# Patient Record
Sex: MALE | Race: WHITE | NOT HISPANIC OR LATINO | Employment: FULL TIME | URBAN - METROPOLITAN AREA
[De-identification: names, ages, dates, MRNs, and addresses within clinical notes are randomized per-mention and may not be internally consistent; named-entity substitution may affect disease eponyms.]

---

## 2025-01-23 ENCOUNTER — LAB (OUTPATIENT)
Dept: LAB | Facility: LAB | Age: 50
End: 2025-01-23
Payer: COMMERCIAL

## 2025-01-23 ENCOUNTER — HOSPITAL ENCOUNTER (OUTPATIENT)
Dept: RADIOLOGY | Facility: HOSPITAL | Age: 50
Discharge: HOME | End: 2025-01-23
Payer: COMMERCIAL

## 2025-01-23 ENCOUNTER — PRE-ADMISSION TESTING (OUTPATIENT)
Dept: PREADMISSION TESTING | Facility: HOSPITAL | Age: 50
End: 2025-01-23
Payer: COMMERCIAL

## 2025-01-23 VITALS
TEMPERATURE: 98.6 F | SYSTOLIC BLOOD PRESSURE: 145 MMHG | DIASTOLIC BLOOD PRESSURE: 96 MMHG | RESPIRATION RATE: 14 BRPM | OXYGEN SATURATION: 97 % | HEIGHT: 72 IN | BODY MASS INDEX: 33.77 KG/M2 | HEART RATE: 96 BPM | WEIGHT: 249.34 LBS

## 2025-01-23 DIAGNOSIS — N28.89 RIGHT RENAL MASS: ICD-10-CM

## 2025-01-23 DIAGNOSIS — R10.9 FLANK PAIN: ICD-10-CM

## 2025-01-23 DIAGNOSIS — Z01.818 PREOP TESTING: Primary | ICD-10-CM

## 2025-01-23 DIAGNOSIS — Z01.818 PREOP TESTING: ICD-10-CM

## 2025-01-23 LAB
ABO GROUP (TYPE) IN BLOOD: NORMAL
ALBUMIN SERPL BCP-MCNC: 5 G/DL (ref 3.4–5)
ALP SERPL-CCNC: 65 U/L (ref 33–120)
ALT SERPL W P-5'-P-CCNC: 42 U/L (ref 10–52)
ANION GAP SERPL CALC-SCNC: 14 MMOL/L (ref 10–20)
ANTIBODY SCREEN: NORMAL
APPEARANCE UR: CLEAR
APTT PPP: 36 SECONDS (ref 27–38)
AST SERPL W P-5'-P-CCNC: 31 U/L (ref 9–39)
ATRIAL RATE: 96 BPM
BILIRUB SERPL-MCNC: 1.2 MG/DL (ref 0–1.2)
BILIRUB UR STRIP.AUTO-MCNC: NEGATIVE MG/DL
BUN SERPL-MCNC: 9 MG/DL (ref 6–23)
CALCIUM SERPL-MCNC: 9.9 MG/DL (ref 8.6–10.3)
CHLORIDE SERPL-SCNC: 100 MMOL/L (ref 98–107)
CO2 SERPL-SCNC: 29 MMOL/L (ref 21–32)
COLOR UR: NORMAL
CREAT SERPL-MCNC: 0.85 MG/DL (ref 0.5–1.3)
EGFRCR SERPLBLD CKD-EPI 2021: >90 ML/MIN/1.73M*2
ERYTHROCYTE [DISTWIDTH] IN BLOOD BY AUTOMATED COUNT: 13.2 % (ref 11.5–14.5)
GLUCOSE SERPL-MCNC: 112 MG/DL (ref 74–99)
GLUCOSE UR STRIP.AUTO-MCNC: NORMAL MG/DL
HCT VFR BLD AUTO: 49 % (ref 41–52)
HGB BLD-MCNC: 16.8 G/DL (ref 13.5–17.5)
INR PPP: 1.1 (ref 0.9–1.1)
KETONES UR STRIP.AUTO-MCNC: NEGATIVE MG/DL
LEUKOCYTE ESTERASE UR QL STRIP.AUTO: NEGATIVE
MCH RBC QN AUTO: 28.9 PG (ref 26–34)
MCHC RBC AUTO-ENTMCNC: 34.3 G/DL (ref 32–36)
MCV RBC AUTO: 84 FL (ref 80–100)
NITRITE UR QL STRIP.AUTO: NEGATIVE
NRBC BLD-RTO: 0 /100 WBCS (ref 0–0)
P AXIS: 51 DEGREES
P OFFSET: 200 MS
P ONSET: 143 MS
PH UR STRIP.AUTO: 5.5 [PH]
PLATELET # BLD AUTO: 210 X10*3/UL (ref 150–450)
POTASSIUM SERPL-SCNC: 4.1 MMOL/L (ref 3.5–5.3)
PR INTERVAL: 156 MS
PROT SERPL-MCNC: 8.5 G/DL (ref 6.4–8.2)
PROT UR STRIP.AUTO-MCNC: NEGATIVE MG/DL
PROTHROMBIN TIME: 12 SECONDS (ref 9.8–12.8)
Q ONSET: 221 MS
QRS COUNT: 16 BEATS
QRS DURATION: 82 MS
QT INTERVAL: 356 MS
QTC CALCULATION(BAZETT): 449 MS
QTC FREDERICIA: 416 MS
R AXIS: 33 DEGREES
RBC # BLD AUTO: 5.81 X10*6/UL (ref 4.5–5.9)
RBC # UR STRIP.AUTO: NEGATIVE /UL
RH FACTOR (ANTIGEN D): NORMAL
SODIUM SERPL-SCNC: 139 MMOL/L (ref 136–145)
SP GR UR STRIP.AUTO: 1.01
T AXIS: 38 DEGREES
T OFFSET: 399 MS
UROBILINOGEN UR STRIP.AUTO-MCNC: NORMAL MG/DL
VENTRICULAR RATE: 96 BPM
WBC # BLD AUTO: 7.8 X10*3/UL (ref 4.4–11.3)

## 2025-01-23 PROCEDURE — 71046 X-RAY EXAM CHEST 2 VIEWS: CPT

## 2025-01-23 PROCEDURE — 85027 COMPLETE CBC AUTOMATED: CPT

## 2025-01-23 PROCEDURE — 86901 BLOOD TYPING SEROLOGIC RH(D): CPT

## 2025-01-23 PROCEDURE — 86850 RBC ANTIBODY SCREEN: CPT

## 2025-01-23 PROCEDURE — 99202 OFFICE O/P NEW SF 15 MIN: CPT | Performed by: NURSE PRACTITIONER

## 2025-01-23 PROCEDURE — 80053 COMPREHEN METABOLIC PANEL: CPT

## 2025-01-23 PROCEDURE — 93005 ELECTROCARDIOGRAM TRACING: CPT

## 2025-01-23 PROCEDURE — 81003 URINALYSIS AUTO W/O SCOPE: CPT

## 2025-01-23 PROCEDURE — 85730 THROMBOPLASTIN TIME PARTIAL: CPT

## 2025-01-23 PROCEDURE — 85610 PROTHROMBIN TIME: CPT

## 2025-01-23 PROCEDURE — 83036 HEMOGLOBIN GLYCOSYLATED A1C: CPT

## 2025-01-23 PROCEDURE — 86900 BLOOD TYPING SEROLOGIC ABO: CPT

## 2025-01-23 RX ORDER — SILDENAFIL 50 MG/1
50 TABLET, FILM COATED ORAL DAILY PRN
COMMUNITY

## 2025-01-23 RX ORDER — INSULIN GLARGINE 100 [IU]/ML
47 INJECTION, SOLUTION SUBCUTANEOUS NIGHTLY
COMMUNITY

## 2025-01-23 RX ORDER — CHOLECALCIFEROL (VITAMIN D3) 50 MCG
50 TABLET ORAL DAILY
COMMUNITY

## 2025-01-23 RX ORDER — DUPILUMAB 300 MG/2ML
300 INJECTION, SOLUTION SUBCUTANEOUS
COMMUNITY

## 2025-01-23 ASSESSMENT — DUKE ACTIVITY SCORE INDEX (DASI)
CAN YOU DO MODERATE WORK AROUND THE HOUSE LIKE VACUUMING, SWEEPING FLOORS OR CARRYING GROCERIES: YES
CAN YOU CLIMB A FLIGHT OF STAIRS OR WALK UP A HILL: YES
CAN YOU PARTICIPATE IN MODERATE RECREATIONAL ACTIVITIES LIKE GOLF, BOWLING, DANCING, DOUBLES TENNIS OR THROWING A BASEBALL OR FOOTBALL: YES
TOTAL_SCORE: 58.2
CAN YOU DO HEAVY WORK AROUND THE HOUSE LIKE SCRUBBING FLOORS OR LIFTING AND MOVING HEAVY FURNITURE: YES
CAN YOU PARTICIPATE IN STRENOUS SPORTS LIKE SWIMMING, SINGLES TENNIS, FOOTBALL, BASKETBALL, OR SKIING: YES
CAN YOU WALK A BLOCK OR TWO ON LEVEL GROUND: YES
CAN YOU HAVE SEXUAL RELATIONS: YES
CAN YOU DO YARD WORK LIKE RAKING LEAVES, WEEDING OR PUSHING A MOWER: YES
CAN YOU DO LIGHT WORK AROUND THE HOUSE LIKE DUSTING OR WASHING DISHES: YES
CAN YOU WALK INDOORS, SUCH AS AROUND YOUR HOUSE: YES
CAN YOU RUN A SHORT DISTANCE: YES
DASI METS SCORE: 9.9
CAN YOU TAKE CARE OF YOURSELF (EAT, DRESS, BATHE, OR USE TOILET): YES

## 2025-01-23 ASSESSMENT — PAIN - FUNCTIONAL ASSESSMENT: PAIN_FUNCTIONAL_ASSESSMENT: 0-10

## 2025-01-23 ASSESSMENT — PAIN SCALES - GENERAL: PAINLEVEL_OUTOF10: 0 - NO PAIN

## 2025-01-23 ASSESSMENT — ACTIVITIES OF DAILY LIVING (ADL): ADL_SCORE: 0

## 2025-01-23 ASSESSMENT — LIFESTYLE VARIABLES: SMOKING_STATUS: NONSMOKER

## 2025-01-23 NOTE — PREPROCEDURE INSTRUCTIONS
Medication List            Accurate as of January 23, 2025  1:59 PM. Always use your most recent med list.                cholecalciferol 50 MCG (2000 UT) tablet  Commonly known as: Vitamin D-3  Additional Medication Adjustments for Surgery: Take last dose 7 days before surgery     Dupixent Syringe 300 mg/2 mL prefilled syringe  Generic drug: dupilumab  Medication Adjustments for Surgery: Do Not take on the morning of surgery     Lantus U-100 Insulin 100 unit/mL injection  Generic drug: insulin glargine  Additional Medication Adjustments for Surgery: Other (Comment)  Notes to patient: Take 1/2 dose the evening before OR and take 1/2 dose the morning of OR     sildenafil 50 mg tablet  Commonly known as: Viagra  Medication Adjustments for Surgery: Take last dose 3 days before surgery          Do not use Imodium day of surgery.                PRE-OPERATIVE INSTRUCTIONS    You will receive notification one business day prior to your procedure to confirm your arrival time. It is important that you answer your phone and/or check your messages during this time. If you do not hear from the surgery center by 5 pm. the day before your procedure, please call [phone].     Please enter the building through the Outpatient entrance and take the elevator off the lobby to the 2nd floor then check in at the Outpatient Surgery desk on the 2nd floor.    INSTRUCTIONS:  Talk to your surgeon for instructions if you should stop your aspirin, blood thinner, or diabetes medicines.  DO NOT take any multivitamins or over the counter supplements for 7-10 days before surgery.  If not being admitted, you must have an adult immediately available to drive you home after surgery. We also highly recommend you have someone stay with you for the entire day and night of your surgery.  For children having surgery, a parent or legal guardian must accompany them to the surgery center. If this is not possible, please call [phone] to make  additional arrangements.  For adults who are unable to consent or make medical decisions for themselves, a legal guardian or Power of  must accompany them to the surgery center. If this is not possible, please call [phone] to make additional arrangements.  Wear comfortable, loose fitting clothing.  All jewelry and piercings must be removed. If you are unable to remove an item or have a dermal piercing, please be sure to tell the nurse when you arrive for surgery.  Nail polish and make-up must be removed.  Avoid smoking or consuming alcohol for 24 hours before surgery.  To help prevent infection, please take a shower/bath and wash your hair the night before and/or morning of surgery (or follow other specific bathing instructions provided).    Preoperative Fasting Guidelines    Why must I stop eating and drinking near surgery time?  With sedation, food or liquid in your stomach can enter your lungs causing serious complications  Increases nausea and vomiting    When do I need to stop eating and drinking before my surgery?  Do not eat any solid food after midnight the night before your surgery/procedure unless otherwise instructed by your surgeon.   You may have up to 13.5 ounces of clear liquid until TWO hours before your instructed arrival time to the hospital.  This includes water, black tea/coffee, (no milk or cream) apple juice, and electrolyte drinks (Gatorade).   You may chew gum until TWO hours before your surgery/procedure      If applicable, notify your surgeons office immediately of any new skin changes that occur to the surgical limb.      If you have any questions or concerns, please call Pre-Admission Testing at [phone].       Home Preoperative Antibacterial Shower with Chlorhexidine gluconate (CHG)     What is a home preoperative antibacterial shower?  This shower is a way of cleaning the skin with a germ killing solution before surgery. The solution contains chlorhexidine gluconate,  commonly known as CHG. CHG is a skin cleanser with germ killing ability. Let your doctor know if you are allergic to chlorhexidine.    Why do I need to take a preoperative antibacterial shower?  Skin is not sterile. It is best to try to make your skin as free of germs as possible before surgery. Proper cleansing with a germ killing soap before surgery can lower the number of germs on your skin. This helps to reduce the risk of infection at the surgical site. Following the instructions listed below will help you prepare your skin for surgery.    How do I use the solution?  Begin using your CHG soap the night before and again the morning of your procedure.   Do not shave the day before or day of surgery.  Remove all jewelry until after surgery. Take off rings and take out all body-piercing jewelry.  Wash your face and hair with normal soap and shampoo before you use the CHG soap.  Apply the CHG solution to a clean wet washcloth. Move away from the water to avoid premature rinsing of the CHG soap as you are applying. Firmly lather your entire body from the neck down. Do not use CHG on your face, eyes, ears, or genitals.   Pay special attention to the area where your incisions will be located.  Do not scrub your skin too hard.  It is important to allow the CHG soap to sit on your skin for 3-5 minutes.  Rinse the solution off your body completely. Do not wash with your normal soap after the CHG soap solution.  Pat yourself dry with a clean, soft towel.  Do not apply powders, lotions or deodorants as these might block how the CHG soap works.   Dress in clean clothing.  Be sure to sleep with clean, freshly laundered sheets.  Be aware that CHG can cause stains on fabric. Rinse your washcloth and other linens that have contact with CHG completely. Use only non-chlorine detergents to launder the items used.

## 2025-01-23 NOTE — CPM/PAT H&P
CPM/PAT Evaluation       Name: Dean Alfaro (Dean Alfaro)  /Age:  y.o.     In-Person      Chief Complaint: Right kidney cancer    HPI49 year old male for right robotic partial nephrectomy with intra op ultrasound - right 25. Patient states he was found to have a right renal mass and was diagnosed with right renal cancer in December that was found after he was diagnosed with food poisoning. He denies dysuria, hematuria, difficulties urinating, abdominal pain or flank pain.     Past Medical History:   Diagnosis Date    Asthma     Eosinophilic esophagitis     GERD (gastroesophageal reflux disease)     History of kidney cancer     Marcin's syndrome     Personal history of other specified conditions 2019    History of shortness of breath    Rheumatoid arthritis     Type 2 diabetes mellitus     Ulcerative colitis        Past Surgical History:   Procedure Laterality Date    ILEOSTOMY      ILEOSTOMY CLOSURE      OTHER SURGICAL HISTORY  2019    Colon surgery    TONSILLECTOMY         Patient  reports being sexually active.    Family History   Problem Relation Name Age of Onset    Cancer Mother      Heart disease Father      Cancer Father         No Known Allergies    Prior to Admission medications    Not on File     Refer to updated medication list on file       Constitutional: Negative for fever, chills, or sweats   ENMT: Negative for nasal discharge, congestion, ear pain, mouth pain, throat pain   Respiratory: Negative for cough, wheezing, shortness of breath   Cardiac: Negative for chest pain, dyspnea on exertion, palpitations   Gastrointestinal: Negative for nausea, vomiting, diarrhea, constipation, abdominal pain  Genitourinary: Negative for dysuria, flank pain, frequency, hematuria   Musculoskeletal: Negative for decreased ROM, pain, swelling, weakness   Neurological: Negative for dizziness, confusion, headache  Psychiatric: Negative for mood changes   Skin: Negative for  itching, rash, ulcer    Hematologic/Lymph: Negative for bruising, easy bleeding  Allergic/Immunologic: Negative itching, sneezing, swelling     Physical Exam  Constitutional:       Appearance: Normal appearance.   HENT:      Head: Normocephalic.   Eyes:      Extraocular Movements: Extraocular movements intact.   Cardiovascular:      Rate and Rhythm: Normal rate and regular rhythm.      Heart sounds: Normal heart sounds.   Pulmonary:      Effort: Pulmonary effort is normal.      Breath sounds: Normal breath sounds.   Abdominal:      General: Bowel sounds are normal.      Palpations: Abdomen is soft.   Musculoskeletal:         General: Normal range of motion.      Cervical back: Normal range of motion.   Skin:     General: Skin is warm and dry.   Neurological:      Mental Status: He is alert and oriented to person, place, and time.   Psychiatric:         Mood and Affect: Mood normal.      PAT AIRWAY:   Airway:     Neck ROM::  Full   Denies missing or loose teeth     Testing/Diagnostic:   Lab Results   Component Value Date    HGBA1C 8.5 (H) 11/18/2024      Patient Specialist/PCP: Dr. Hamm    Visit Vitals  BP (!) 145/96   Pulse 96   Temp 37 °C (98.6 °F) (Temporal)   Resp 14   Ht 1.829 m (6')   Wt 113 kg (249 lb 5.4 oz)   SpO2 97%   BMI 33.82 kg/m²   Smoking Status Never   BSA 2.4 m²       DASI Risk Score      Flowsheet Row Pre-Admission Testing from 1/23/2025 in [facility] Questionnaire Series Submission from 1/21/2025 in [facility] with Generic Provider Tricia   Can you take care of yourself (eat, dress, bathe, or use toilet)?  2.75 filed at 01/23/2025 1332 2.75  filed at 01/21/2025 1013   Can you walk indoors, such as around your house? 1.75 filed at 01/23/2025 1332 1.75  filed at 01/21/2025 1013   Can you walk a block or two on level ground?  2.75 filed at 01/23/2025 1332 2.75  filed at 01/21/2025 1013   Can you climb a flight of stairs or walk up a hill? 5.5 filed at 01/23/2025 1332 5.5  filed at  01/21/2025 1013   Can you run a short distance? 8 filed at 01/23/2025 1332 8  filed at 01/21/2025 1013   Can you do light work around the house like dusting or washing dishes? 2.7 filed at 01/23/2025 1332 2.7  filed at 01/21/2025 1013   Can you do moderate work around the house like vacuuming, sweeping floors or carrying groceries? 3.5 filed at 01/23/2025 1332 3.5  filed at 01/21/2025 1013   Can you do heavy work around the house like scrubbing floors or lifting and moving heavy furniture?  8 filed at 01/23/2025 1332 8  filed at 01/21/2025 1013   Can you do yard work like raking leaves, weeding or pushing a mower? 4.5 filed at 01/23/2025 1332 4.5  filed at 01/21/2025 1013   Can you have sexual relations? 5.25 filed at 01/23/2025 1332 5.25  filed at 01/21/2025 1013   Can you participate in moderate recreational activities like golf, bowling, dancing, doubles tennis or throwing a baseball or football? 6 filed at 01/23/2025 1332 6  filed at 01/21/2025 1013   Can you participate in strenous sports like swimming, singles tennis, football, basketball, or skiing? 7.5 filed at 01/23/2025 1332 7.5  filed at 01/21/2025 1013   DASI SCORE 58.2 filed at 01/23/2025 1332 58.2  filed at 01/21/2025 1013   METS Score (Will be calculated only when all the questions are answered) 9.9 filed at 01/23/2025 1332 9.9  filed at 01/21/2025 1013          Linneai DVT Assessment      Flowsheet Row Pre-Admission Testing from 1/23/2025 in [facility]   DVT Score (IF A SCORE IS NOT CALCULATING, MUST SELECT A BMI TO COMPLETE) 9 filed at 01/23/2025 1331   Surgical Factors Major surgery planned, lasting over 3 hours filed at 01/23/2025 1331   BMI (BMI MUST BE CHOSEN) 31-40 (Obesity) filed at 01/23/2025 1331          Modified Frailty Index      Flowsheet Row Pre-Admission Testing from 1/23/2025 in [facility]   Non-independent functional status (problems with dressing, bathing, personal grooming, or cooking) 0 filed at  01/23/2025 1332   History of diabetes mellitus  0.0909 filed at 01/23/2025 1332   History of COPD 0 filed at 01/23/2025 1332   History of CHF No filed at 01/23/2025 1332   History of MI 0 filed at 01/23/2025 1332   History of Percutaneous Coronary Intervention, Cardiac Surgery, or Angina No filed at 01/23/2025 1332   Hypertension requiring the use of medication  0 filed at 01/23/2025 1332   Peripheral vascular disease 0 filed at 01/23/2025 1332   Impaired sensorium (cognitive impairement or loss, clouding, or delirium) 0 filed at 01/23/2025 1332   TIA or CVA withouy residual deficit 0 filed at 01/23/2025 1332   Cerebrovascular accident with deficit 0 filed at 01/23/2025 1332   Modified Frailty Index Calculator .0909 filed at 01/23/2025 1332          CHADS2 Stroke Risk  Current as of today        N/A 3 to 100%: High Risk   2 to < 3%: Medium Risk   0 to < 2%: Low Risk     Last Change: N/A          This score determines the patient's risk of having a stroke if the patient has atrial fibrillation.        This score is not applicable to this patient. Components are not calculated.          Revised Cardiac Risk Index      Flowsheet Row Pre-Admission Testing from 1/23/2025 in [facility]   High-Risk Surgery (Intraperitoneal, Intrathoracic,Suprainguinal vascular) 0 filed at 01/23/2025 1332   History of ischemic heart disease (History of MI, History of positive exercuse test, Current chest paint considered due to myocardial ischemia, Use of nitrate therapy, ECG with pathological Q Waves) 0 filed at 01/23/2025 1332   History of congestive heart failure (pulmonary edemia, bilateral rales or S3 gallop, Paroxysmal nocturnal dyspnea, CXR showing pulmonary vascular redistribution) 0 filed at 01/23/2025 1332   History of cerebrovascular disease (Prior TIA or stroke) 0 filed at 01/23/2025 1332   Pre-operative insulin treatment 1 filed at 01/23/2025 1332   Pre-operative creatinine>2 mg/dl 0 filed at 01/23/2025 1332    Revised Cardiac Risk Calculator 1 filed at 2025 1332          Apfel Simplified Score      Flowsheet Row Pre-Admission Testing from 2025 in [facility]   Smoking status 1 filed at 2025 1332   History of motion sickness or PONV  0 filed at 2025 1332   Use of postoperative opioids 1 filed at 2025 1332   Gender - Female 0=No filed at 2025 1332   Apfel Simplified Score Calculator 2 filed at 2025 1332          Risk Analysis Index Results This Encounter         2025  1333             Do you live in a place other than your own home?: 0    When did you begin living in the place you are currently residing?: Greater than one year ago    Any kidney failure, kidney not working well, or seeing a kidney doctor (nephrologist)? If yes, was this for kidney stones or another problem?: 0 No    Any history of chronic (long-term) congestive heart failure (CHF)?: 0 No    Any shortness of breath when resting?: 0 No    In the past five years, have you been diagnosed with or treated for cancer?: Yes    During the last 3 months has it become difficult for you to remember things or organize your thoughts?: 0 No    Have you lost weight of 10 pounds or more in the past 3 months without trying?: 0 No    Do you have any loss of appetitie?: 0 No    Getting Around (Mobility): 0 Can get around without help    Eatin Can plan and prepare own meals    Toiletin Can use toilet without any help    Personal Hygiene (Bathing, Hand Washing, Changing Clothes): 0 Can shower or bathe without any help    FLOR Cancer History: Patient indicates history of cancer    Total Risk Analysis Index Score Without Cancer: 17    Total Risk Analysis Index Score: 35          Stop Bang Score      Flowsheet Row Pre-Admission Testing from 2025 in [facility] Questionnaire Series Submission from 2025 in [facility] with Generic Provider Tricia   Do you snore loudly? 0 filed at  01/23/2025 1331 0  filed at 01/21/2025 1013   Do you often feel tired or fatigued after your sleep? 0 filed at 01/23/2025 1331 0  filed at 01/21/2025 1013   Has anyone ever observed you stop breathing in your sleep? 0 filed at 01/23/2025 1331 0  filed at 01/21/2025 1013   Do you have or are you being treated for high blood pressure? 0 filed at 01/23/2025 1331 0  filed at 01/21/2025 1013   Recent BMI (Calculated) 33.8 filed at 01/23/2025 1331 36  filed at 01/21/2025 1013   Is BMI greater than 35 kg/m2? 0=No filed at 01/23/2025 1331 1=Yes  filed at 01/21/2025 1013   Age older than 50 years old? 1=Yes filed at 01/23/2025 1331 1=Yes  filed at 01/21/2025 1013   Is your neck circumference greater than 17 inches (Male) or 16 inches (Female)? 1 filed at 01/23/2025 1331 --   Gender - Male 1=Yes filed at 01/23/2025 1331 1=Yes  filed at 01/21/2025 1013   STOP-BANG Total Score 3 filed at 01/23/2025 1331 --          Prodigy: High Risk  Total Score: 8              Prodigy Gender Score          ARISCAT Score for Postoperative Pulmonary Complications      Flowsheet Row Pre-Admission Testing from 1/23/2025 in [facility]   Age Calculated Score 3 filed at 01/23/2025 1333   Preoperative SpO2 0 filed at 01/23/2025 1333   Respiratory infection in the last month Either upper or lower (i.e., URI, bronchitis, pneumonia), with fever and antibiotic treatment 0 filed at 01/23/2025 1333   Preoperative anemia (Hgb less than 10 g/dl) 0 filed at 01/23/2025 1333   Surgical incision  15 filed at 01/23/2025 1333   Duration of surgery  23 filed at 01/23/2025 1333   Emergency Procedure  0 filed at 01/23/2025 1333   ARISCAT Total Score  41 filed at 01/23/2025 1333          Melissa Perioperative Risk for Myocardial Infarction or Cardiac Arrest (KENYON)      Flowsheet Row Pre-Admission Testing from 1/23/2025 in [facility]   Calculated Age Score 1.02 filed at 01/23/2025 6303   Functional Status  0 filed at 01/23/2025 1333   ASA  Class  -3.29 filed at 01/23/2025 1333   Creatinine 0 filed at 01/23/2025 1333   Type of Procedure  0.59 filed at 01/23/2025 1333   KENYON Total Score  -6.93 filed at 01/23/2025 1333   KENYON % 0.1 filed at 01/23/2025 1333          Assessment and Plan:     Preop:   OR with Dr Arana.   Labs today as ordered per Dr. Arana. Repeat hgbAIC today  EKG obtained and enclosed. NSR at 96 bpm.     Marcin's Syndrome (Sarcoidosis)    Neurologic:   Brain Exercise written information provided to patient  The patient is at an increased risk for perioperative stroke secondary to DM .    Cardiac:  DASI Score: 58.2   MET Score: 9.9  RCRI  1 which is 6% 30 day risk of MACE (risk for cardiac death, nonfatal myocardial infarction, and nonfactal cardiac arrest)  KENYON score which indicates a 0.1% risk of intraoperative or 30-day postoperative MACE    Pulmonary:   STOP-BANG score of  3. Intermediate risk of obstructive sleep apnea.   ARISCAT: 41 points which is a intermediate (13.3%) risk of in-hospital post-op pulmonary complications  Asthma: rare use of inhaler (last time 2 years ago)    Endocrine:  Type 2 Diabetes: on Insulin, last hgbAIC 8.5. Blood sugars at home 130-140s    GI:  Apfel: 2 points 39% risk for post operative N/V  Eosinophilic Esophagitis: compliant with medication  GERD  Ulcerative Colitis: patient has a j pouch    /Renal:   Above    Musculoskeletal:  RA    Hematologic:   Caprini score 9, patient at highest risk for perioperative DVT. Patient provided with VTE education/handout.     Anesthesia: No history of anesthesia complications. No anesthesia concerns.      *See risk scores as previously documented

## 2025-01-23 NOTE — PREPROCEDURE INSTRUCTIONS
Thank you for visiting Preadmission Testing at [facility]. If you have any changes to your health condition, please call the SURGEON's office to alert them and give them details of your symptoms.        Preoperative Brain Exercises    What are brain exercises?  A brain exercise is any activity that engages your thinking (cognitive) skills.    What types of activities are considered brain exercises?  Jigsaw puzzles, crossword puzzles, word jumble, memory games, word search, and many more.  Many can be found free online or on your phone via a mobile bhaskar.    Why should I do brain exercises before my surgery?  More recent research has shown brain exercise before surgery can lower the risk of postoperative delirium (confusion) which can be especially important for older adults.  Patients who did brain exercises for 5 to 10 hours the days before surgery, cut their risk of postoperative delirium in half up to 1 week after surgery.      Preoperative Deep Breathing Exercises    Why it is important to do deep breathing exercises before my surgery?  Deep breathing exercises strengthen your breathing muscles.  This helps you to recover after your surgery and decreases the chance of breathing complications.    How are the deep breathing exercises done?  Sit straight with your back supported.  Breathe in deeply and slowly through your nose. Your lower rib cage should expand and your abdomen may move forward.  Hold that breath for 3 to 5 seconds.  Breathe out through pursed lips, slowly and completely.  Rest and repeat 10 times every hour while awake.  Rest longer if you become dizzy or lightheaded.      Patient and Family Education   Ways You Can Help Prevent Blood Clots     This handout explains some simple things you can do to help prevent blood clots.      Blood clots are blockages that can form in the body's veins. When a blood clot forms in your deep veins, it may be called a deep vein thrombosis, or DVT for short. Blood clots can  happen in any part of the body where blood flows, but they are most common in the arms and legs. If a piece of a blood clot breaks free and travels to the lungs, it is called a pulmonary embolus (PE). A PE can be a very serious problem.      Being in the hospital or having surgery can raise your chances of getting a blood clot because you may not be well enough to move around as much as you normally do.      Ways you can help prevent blood clots in the hospital         Wearing SCDs. SCDs stands for Sequential Compression Devices.   SCDs are special sleeves that wrap around your legs  They attach to a pump that fills them with air to gently squeeze your legs every few minutes.   This helps return the blood in your legs to your heart.   SCDs should only be taken off when walking or bathing.   SCDs may not be comfortable, but they can help save your life.               Wearing compression stockings - if your doctor orders them. These special snug fitting stockings gently squeeze your legs to help blood flow.       Walking. Walking helps move the blood in your legs.   If your doctor says it is ok, try walking the halls at least   5 times a day. Ask us to help you get up, so you don't fall.      Taking any blood thinning medicines your doctor orders.          ©[facility]; 3/23        Ways you can help prevent blood clots at home       Wearing compression stockings - if your doctor orders them. ? Walking - to help move the blood in your legs.       Taking any blood thinning medicines your doctor orders.      Signs of a blood clot or PE      Tell your doctor or nurse know right away if you have of the problems listed below.    If you are at home, seek medical care right away. Call 911 for chest pain or problems breathing.          Signs of a blood clot (DVT) - such as pain,  swelling, redness or warmth in your arm or leg      Signs of a pulmonary embolism (PE) - such as chest     pain or feeling short of breath

## 2025-01-23 NOTE — H&P (VIEW-ONLY)
CPM/PAT Evaluation       Name: Dean Alfaro (Dean Alfaro)  /Age:  y.o.     In-Person      Chief Complaint: Right kidney cancer    HPI49 year old male for right robotic partial nephrectomy with intra op ultrasound - right 25. Patient states he was found to have a right renal mass and was diagnosed with right renal cancer in December that was found after he was diagnosed with food poisoning. He denies dysuria, hematuria, difficulties urinating, abdominal pain or flank pain.     Past Medical History:   Diagnosis Date    Asthma     Eosinophilic esophagitis     GERD (gastroesophageal reflux disease)     History of kidney cancer     Marcin's syndrome     Personal history of other specified conditions 2019    History of shortness of breath    Rheumatoid arthritis     Type 2 diabetes mellitus     Ulcerative colitis        Past Surgical History:   Procedure Laterality Date    ILEOSTOMY      ILEOSTOMY CLOSURE      OTHER SURGICAL HISTORY  2019    Colon surgery    TONSILLECTOMY         Patient  reports being sexually active.    Family History   Problem Relation Name Age of Onset    Cancer Mother      Heart disease Father      Cancer Father         No Known Allergies    Prior to Admission medications    Not on File     Refer to updated medication list on file       Constitutional: Negative for fever, chills, or sweats   ENMT: Negative for nasal discharge, congestion, ear pain, mouth pain, throat pain   Respiratory: Negative for cough, wheezing, shortness of breath   Cardiac: Negative for chest pain, dyspnea on exertion, palpitations   Gastrointestinal: Negative for nausea, vomiting, diarrhea, constipation, abdominal pain  Genitourinary: Negative for dysuria, flank pain, frequency, hematuria   Musculoskeletal: Negative for decreased ROM, pain, swelling, weakness   Neurological: Negative for dizziness, confusion, headache  Psychiatric: Negative for mood changes   Skin: Negative for  itching, rash, ulcer    Hematologic/Lymph: Negative for bruising, easy bleeding  Allergic/Immunologic: Negative itching, sneezing, swelling     Physical Exam  Constitutional:       Appearance: Normal appearance.   HENT:      Head: Normocephalic.   Eyes:      Extraocular Movements: Extraocular movements intact.   Cardiovascular:      Rate and Rhythm: Normal rate and regular rhythm.      Heart sounds: Normal heart sounds.   Pulmonary:      Effort: Pulmonary effort is normal.      Breath sounds: Normal breath sounds.   Abdominal:      General: Bowel sounds are normal.      Palpations: Abdomen is soft.   Musculoskeletal:         General: Normal range of motion.      Cervical back: Normal range of motion.   Skin:     General: Skin is warm and dry.   Neurological:      Mental Status: He is alert and oriented to person, place, and time.   Psychiatric:         Mood and Affect: Mood normal.      PAT AIRWAY:   Airway:     Neck ROM::  Full   Denies missing or loose teeth     Testing/Diagnostic:   Lab Results   Component Value Date    HGBA1C 8.5 (H) 11/18/2024      Patient Specialist/PCP: Dr. Hamm    Visit Vitals  BP (!) 145/96   Pulse 96   Temp 37 °C (98.6 °F) (Temporal)   Resp 14   Ht 1.829 m (6')   Wt 113 kg (249 lb 5.4 oz)   SpO2 97%   BMI 33.82 kg/m²   Smoking Status Never   BSA 2.4 m²       DASI Risk Score      Flowsheet Row Pre-Admission Testing from 1/23/2025 in [facility] Questionnaire Series Submission from 1/21/2025 in [facility] with Generic Provider Tricia   Can you take care of yourself (eat, dress, bathe, or use toilet)?  2.75 filed at 01/23/2025 1332 2.75  filed at 01/21/2025 1013   Can you walk indoors, such as around your house? 1.75 filed at 01/23/2025 1332 1.75  filed at 01/21/2025 1013   Can you walk a block or two on level ground?  2.75 filed at 01/23/2025 1332 2.75  filed at 01/21/2025 1013   Can you climb a flight of stairs or walk up a hill? 5.5 filed at 01/23/2025 1332 5.5  filed at  01/21/2025 1013   Can you run a short distance? 8 filed at 01/23/2025 1332 8  filed at 01/21/2025 1013   Can you do light work around the house like dusting or washing dishes? 2.7 filed at 01/23/2025 1332 2.7  filed at 01/21/2025 1013   Can you do moderate work around the house like vacuuming, sweeping floors or carrying groceries? 3.5 filed at 01/23/2025 1332 3.5  filed at 01/21/2025 1013   Can you do heavy work around the house like scrubbing floors or lifting and moving heavy furniture?  8 filed at 01/23/2025 1332 8  filed at 01/21/2025 1013   Can you do yard work like raking leaves, weeding or pushing a mower? 4.5 filed at 01/23/2025 1332 4.5  filed at 01/21/2025 1013   Can you have sexual relations? 5.25 filed at 01/23/2025 1332 5.25  filed at 01/21/2025 1013   Can you participate in moderate recreational activities like golf, bowling, dancing, doubles tennis or throwing a baseball or football? 6 filed at 01/23/2025 1332 6  filed at 01/21/2025 1013   Can you participate in strenous sports like swimming, singles tennis, football, basketball, or skiing? 7.5 filed at 01/23/2025 1332 7.5  filed at 01/21/2025 1013   DASI SCORE 58.2 filed at 01/23/2025 1332 58.2  filed at 01/21/2025 1013   METS Score (Will be calculated only when all the questions are answered) 9.9 filed at 01/23/2025 1332 9.9  filed at 01/21/2025 1013          Linneai DVT Assessment      Flowsheet Row Pre-Admission Testing from 1/23/2025 in [facility]   DVT Score (IF A SCORE IS NOT CALCULATING, MUST SELECT A BMI TO COMPLETE) 9 filed at 01/23/2025 1331   Surgical Factors Major surgery planned, lasting over 3 hours filed at 01/23/2025 1331   BMI (BMI MUST BE CHOSEN) 31-40 (Obesity) filed at 01/23/2025 1331          Modified Frailty Index      Flowsheet Row Pre-Admission Testing from 1/23/2025 in [facility]   Non-independent functional status (problems with dressing, bathing, personal grooming, or cooking) 0 filed at  01/23/2025 1332   History of diabetes mellitus  0.0909 filed at 01/23/2025 1332   History of COPD 0 filed at 01/23/2025 1332   History of CHF No filed at 01/23/2025 1332   History of MI 0 filed at 01/23/2025 1332   History of Percutaneous Coronary Intervention, Cardiac Surgery, or Angina No filed at 01/23/2025 1332   Hypertension requiring the use of medication  0 filed at 01/23/2025 1332   Peripheral vascular disease 0 filed at 01/23/2025 1332   Impaired sensorium (cognitive impairement or loss, clouding, or delirium) 0 filed at 01/23/2025 1332   TIA or CVA withouy residual deficit 0 filed at 01/23/2025 1332   Cerebrovascular accident with deficit 0 filed at 01/23/2025 1332   Modified Frailty Index Calculator .0909 filed at 01/23/2025 1332          CHADS2 Stroke Risk  Current as of today        N/A 3 to 100%: High Risk   2 to < 3%: Medium Risk   0 to < 2%: Low Risk     Last Change: N/A          This score determines the patient's risk of having a stroke if the patient has atrial fibrillation.        This score is not applicable to this patient. Components are not calculated.          Revised Cardiac Risk Index      Flowsheet Row Pre-Admission Testing from 1/23/2025 in [facility]   High-Risk Surgery (Intraperitoneal, Intrathoracic,Suprainguinal vascular) 0 filed at 01/23/2025 1332   History of ischemic heart disease (History of MI, History of positive exercuse test, Current chest paint considered due to myocardial ischemia, Use of nitrate therapy, ECG with pathological Q Waves) 0 filed at 01/23/2025 1332   History of congestive heart failure (pulmonary edemia, bilateral rales or S3 gallop, Paroxysmal nocturnal dyspnea, CXR showing pulmonary vascular redistribution) 0 filed at 01/23/2025 1332   History of cerebrovascular disease (Prior TIA or stroke) 0 filed at 01/23/2025 1332   Pre-operative insulin treatment 1 filed at 01/23/2025 1332   Pre-operative creatinine>2 mg/dl 0 filed at 01/23/2025 1332    Revised Cardiac Risk Calculator 1 filed at 2025 1332          Apfel Simplified Score      Flowsheet Row Pre-Admission Testing from 2025 in [facility]   Smoking status 1 filed at 2025 1332   History of motion sickness or PONV  0 filed at 2025 1332   Use of postoperative opioids 1 filed at 2025 1332   Gender - Female 0=No filed at 2025 1332   Apfel Simplified Score Calculator 2 filed at 2025 1332          Risk Analysis Index Results This Encounter         2025  1333             Do you live in a place other than your own home?: 0    When did you begin living in the place you are currently residing?: Greater than one year ago    Any kidney failure, kidney not working well, or seeing a kidney doctor (nephrologist)? If yes, was this for kidney stones or another problem?: 0 No    Any history of chronic (long-term) congestive heart failure (CHF)?: 0 No    Any shortness of breath when resting?: 0 No    In the past five years, have you been diagnosed with or treated for cancer?: Yes    During the last 3 months has it become difficult for you to remember things or organize your thoughts?: 0 No    Have you lost weight of 10 pounds or more in the past 3 months without trying?: 0 No    Do you have any loss of appetitie?: 0 No    Getting Around (Mobility): 0 Can get around without help    Eatin Can plan and prepare own meals    Toiletin Can use toilet without any help    Personal Hygiene (Bathing, Hand Washing, Changing Clothes): 0 Can shower or bathe without any help    FLOR Cancer History: Patient indicates history of cancer    Total Risk Analysis Index Score Without Cancer: 17    Total Risk Analysis Index Score: 35          Stop Bang Score      Flowsheet Row Pre-Admission Testing from 2025 in [facility] Questionnaire Series Submission from 2025 in [facility] with Generic Provider Tricia   Do you snore loudly? 0 filed at  01/23/2025 1331 0  filed at 01/21/2025 1013   Do you often feel tired or fatigued after your sleep? 0 filed at 01/23/2025 1331 0  filed at 01/21/2025 1013   Has anyone ever observed you stop breathing in your sleep? 0 filed at 01/23/2025 1331 0  filed at 01/21/2025 1013   Do you have or are you being treated for high blood pressure? 0 filed at 01/23/2025 1331 0  filed at 01/21/2025 1013   Recent BMI (Calculated) 33.8 filed at 01/23/2025 1331 36  filed at 01/21/2025 1013   Is BMI greater than 35 kg/m2? 0=No filed at 01/23/2025 1331 1=Yes  filed at 01/21/2025 1013   Age older than 50 years old? 1=Yes filed at 01/23/2025 1331 1=Yes  filed at 01/21/2025 1013   Is your neck circumference greater than 17 inches (Male) or 16 inches (Female)? 1 filed at 01/23/2025 1331 --   Gender - Male 1=Yes filed at 01/23/2025 1331 1=Yes  filed at 01/21/2025 1013   STOP-BANG Total Score 3 filed at 01/23/2025 1331 --          Prodigy: High Risk  Total Score: 8              Prodigy Gender Score          ARISCAT Score for Postoperative Pulmonary Complications      Flowsheet Row Pre-Admission Testing from 1/23/2025 in [facility]   Age Calculated Score 3 filed at 01/23/2025 1333   Preoperative SpO2 0 filed at 01/23/2025 1333   Respiratory infection in the last month Either upper or lower (i.e., URI, bronchitis, pneumonia), with fever and antibiotic treatment 0 filed at 01/23/2025 1333   Preoperative anemia (Hgb less than 10 g/dl) 0 filed at 01/23/2025 1333   Surgical incision  15 filed at 01/23/2025 1333   Duration of surgery  23 filed at 01/23/2025 1333   Emergency Procedure  0 filed at 01/23/2025 1333   ARISCAT Total Score  41 filed at 01/23/2025 1333          Melissa Perioperative Risk for Myocardial Infarction or Cardiac Arrest (KENYON)      Flowsheet Row Pre-Admission Testing from 1/23/2025 in [facility]   Calculated Age Score 1.02 filed at 01/23/2025 4633   Functional Status  0 filed at 01/23/2025 1333   ASA  Class  -3.29 filed at 01/23/2025 1333   Creatinine 0 filed at 01/23/2025 1333   Type of Procedure  0.59 filed at 01/23/2025 1333   KENYON Total Score  -6.93 filed at 01/23/2025 1333   KENYON % 0.1 filed at 01/23/2025 1333          Assessment and Plan:     Preop:   OR with Dr Arana.   Labs today as ordered per Dr. Arana. Repeat hgbAIC today  EKG obtained and enclosed. NSR at 96 bpm.     Marcin's Syndrome (Sarcoidosis)    Neurologic:   Brain Exercise written information provided to patient  The patient is at an increased risk for perioperative stroke secondary to DM .    Cardiac:  DASI Score: 58.2   MET Score: 9.9  RCRI  1 which is 6% 30 day risk of MACE (risk for cardiac death, nonfatal myocardial infarction, and nonfactal cardiac arrest)  KENYON score which indicates a 0.1% risk of intraoperative or 30-day postoperative MACE    Pulmonary:   STOP-BANG score of  3. Intermediate risk of obstructive sleep apnea.   ARISCAT: 41 points which is a intermediate (13.3%) risk of in-hospital post-op pulmonary complications  Asthma: rare use of inhaler (last time 2 years ago)    Endocrine:  Type 2 Diabetes: on Insulin, last hgbAIC 8.5. Blood sugars at home 130-140s    GI:  Apfel: 2 points 39% risk for post operative N/V  Eosinophilic Esophagitis: compliant with medication  GERD  Ulcerative Colitis: patient has a j pouch    /Renal:   Above    Musculoskeletal:  RA    Hematologic:   Caprini score 9, patient at highest risk for perioperative DVT. Patient provided with VTE education/handout.     Anesthesia: No history of anesthesia complications. No anesthesia concerns.      *See risk scores as previously documented

## 2025-01-24 LAB
BB ANTIBODY IDENTIFICATION: NORMAL
CASE #: NORMAL
EST. AVERAGE GLUCOSE BLD GHB EST-MCNC: 114 MG/DL
HBA1C MFR BLD: 5.6 %
HOLD SPECIMEN: NORMAL

## 2025-02-05 ENCOUNTER — ANESTHESIA EVENT (OUTPATIENT)
Dept: OPERATING ROOM | Facility: HOSPITAL | Age: 50
End: 2025-02-05
Payer: COMMERCIAL

## 2025-02-06 ENCOUNTER — ANESTHESIA (OUTPATIENT)
Dept: OPERATING ROOM | Facility: HOSPITAL | Age: 50
End: 2025-02-06
Payer: COMMERCIAL

## 2025-02-06 ENCOUNTER — HOSPITAL ENCOUNTER (OUTPATIENT)
Facility: HOSPITAL | Age: 50
Discharge: HOME | End: 2025-02-07
Attending: UROLOGY | Admitting: UROLOGY
Payer: COMMERCIAL

## 2025-02-06 DIAGNOSIS — K66.0 PERITONEAL ADHESIONS: ICD-10-CM

## 2025-02-06 DIAGNOSIS — N28.89 RIGHT RENAL MASS: Primary | ICD-10-CM

## 2025-02-06 DIAGNOSIS — R10.9 FLANK PAIN: ICD-10-CM

## 2025-02-06 DIAGNOSIS — D49.511 NEOPLASM OF UNSPECIFIED BEHAVIOR OF RIGHT KIDNEY: ICD-10-CM

## 2025-02-06 LAB
ABO GROUP (TYPE) IN BLOOD: NORMAL
ANION GAP SERPL CALC-SCNC: 15 MMOL/L (ref 10–20)
ANTIBODY SCREEN: NORMAL
BUN SERPL-MCNC: 14 MG/DL (ref 6–23)
CALCIUM SERPL-MCNC: 7.8 MG/DL (ref 8.6–10.3)
CHLORIDE SERPL-SCNC: 102 MMOL/L (ref 98–107)
CO2 SERPL-SCNC: 21 MMOL/L (ref 21–32)
CREAT SERPL-MCNC: 1.22 MG/DL (ref 0.5–1.3)
EGFRCR SERPLBLD CKD-EPI 2021: 72 ML/MIN/1.73M*2
ERYTHROCYTE [DISTWIDTH] IN BLOOD BY AUTOMATED COUNT: 13.4 % (ref 11.5–14.5)
GLUCOSE BLD MANUAL STRIP-MCNC: 160 MG/DL (ref 74–99)
GLUCOSE BLD MANUAL STRIP-MCNC: 187 MG/DL (ref 74–99)
GLUCOSE SERPL-MCNC: 206 MG/DL (ref 74–99)
HCT VFR BLD AUTO: 42.7 % (ref 41–52)
HGB BLD-MCNC: 14.6 G/DL (ref 13.5–17.5)
HOLD SPECIMEN: NORMAL
MCH RBC QN AUTO: 29.3 PG (ref 26–34)
MCHC RBC AUTO-ENTMCNC: 34.2 G/DL (ref 32–36)
MCV RBC AUTO: 86 FL (ref 80–100)
NRBC BLD-RTO: 0 /100 WBCS (ref 0–0)
PLATELET # BLD AUTO: 206 X10*3/UL (ref 150–450)
POTASSIUM SERPL-SCNC: 4.2 MMOL/L (ref 3.5–5.3)
RBC # BLD AUTO: 4.99 X10*6/UL (ref 4.5–5.9)
RH FACTOR (ANTIGEN D): NORMAL
SODIUM SERPL-SCNC: 134 MMOL/L (ref 136–145)
WBC # BLD AUTO: 16.5 X10*3/UL (ref 4.4–11.3)

## 2025-02-06 PROCEDURE — 2500000004 HC RX 250 GENERAL PHARMACY W/ HCPCS (ALT 636 FOR OP/ED): Performed by: UROLOGY

## 2025-02-06 PROCEDURE — 96372 THER/PROPH/DIAG INJ SC/IM: CPT | Performed by: UROLOGY

## 2025-02-06 PROCEDURE — A50543 PR LAP,PARTIAL NEPHRECTOMY: Performed by: NURSE ANESTHETIST, CERTIFIED REGISTERED

## 2025-02-06 PROCEDURE — 2500000002 HC RX 250 W HCPCS SELF ADMINISTERED DRUGS (ALT 637 FOR MEDICARE OP, ALT 636 FOR OP/ED): Performed by: NURSE ANESTHETIST, CERTIFIED REGISTERED

## 2025-02-06 PROCEDURE — 2500000001 HC RX 250 WO HCPCS SELF ADMINISTERED DRUGS (ALT 637 FOR MEDICARE OP): Performed by: UROLOGY

## 2025-02-06 PROCEDURE — 37799 UNLISTED PX VASCULAR SURGERY: CPT | Performed by: UROLOGY

## 2025-02-06 PROCEDURE — 36620 INSERTION CATHETER ARTERY: CPT | Performed by: NURSE ANESTHETIST, CERTIFIED REGISTERED

## 2025-02-06 PROCEDURE — 2720000007 HC OR 272 NO HCPCS: Performed by: UROLOGY

## 2025-02-06 PROCEDURE — A50543 PR LAP,PARTIAL NEPHRECTOMY: Performed by: ANESTHESIOLOGY

## 2025-02-06 PROCEDURE — C1889 IMPLANT/INSERT DEVICE, NOC: HCPCS | Performed by: UROLOGY

## 2025-02-06 PROCEDURE — 2500000005 HC RX 250 GENERAL PHARMACY W/O HCPCS

## 2025-02-06 PROCEDURE — 7100000001 HC RECOVERY ROOM TIME - INITIAL BASE CHARGE: Performed by: UROLOGY

## 2025-02-06 PROCEDURE — 7100000011 HC EXTENDED STAY RECOVERY HOURLY - NURSING UNIT

## 2025-02-06 PROCEDURE — 88307 TISSUE EXAM BY PATHOLOGIST: CPT | Mod: TC,STJLAB | Performed by: UROLOGY

## 2025-02-06 PROCEDURE — 3600000009 HC OR TIME - EACH INCREMENTAL 1 MINUTE - PROCEDURE LEVEL FOUR: Performed by: UROLOGY

## 2025-02-06 PROCEDURE — 36415 COLL VENOUS BLD VENIPUNCTURE: CPT | Performed by: UROLOGY

## 2025-02-06 PROCEDURE — 3600000004 HC OR TIME - INITIAL BASE CHARGE - PROCEDURE LEVEL FOUR: Performed by: UROLOGY

## 2025-02-06 PROCEDURE — 7100000002 HC RECOVERY ROOM TIME - EACH INCREMENTAL 1 MINUTE: Performed by: UROLOGY

## 2025-02-06 PROCEDURE — 80048 BASIC METABOLIC PNL TOTAL CA: CPT | Performed by: UROLOGY

## 2025-02-06 PROCEDURE — 2500000004 HC RX 250 GENERAL PHARMACY W/ HCPCS (ALT 636 FOR OP/ED): Mod: JZ | Performed by: NURSE ANESTHETIST, CERTIFIED REGISTERED

## 2025-02-06 PROCEDURE — 86922 COMPATIBILITY TEST ANTIGLOB: CPT

## 2025-02-06 PROCEDURE — 85027 COMPLETE CBC AUTOMATED: CPT | Performed by: UROLOGY

## 2025-02-06 PROCEDURE — 2500000004 HC RX 250 GENERAL PHARMACY W/ HCPCS (ALT 636 FOR OP/ED)

## 2025-02-06 PROCEDURE — 3700000001 HC GENERAL ANESTHESIA TIME - INITIAL BASE CHARGE: Performed by: UROLOGY

## 2025-02-06 PROCEDURE — 2500000005 HC RX 250 GENERAL PHARMACY W/O HCPCS: Performed by: NURSE ANESTHETIST, CERTIFIED REGISTERED

## 2025-02-06 PROCEDURE — 86901 BLOOD TYPING SEROLOGIC RH(D): CPT | Performed by: UROLOGY

## 2025-02-06 PROCEDURE — 2500000004 HC RX 250 GENERAL PHARMACY W/ HCPCS (ALT 636 FOR OP/ED): Mod: JZ | Performed by: ANESTHESIOLOGY

## 2025-02-06 PROCEDURE — 44180 LAP ENTEROLYSIS: CPT | Performed by: STUDENT IN AN ORGANIZED HEALTH CARE EDUCATION/TRAINING PROGRAM

## 2025-02-06 PROCEDURE — 3700000002 HC GENERAL ANESTHESIA TIME - EACH INCREMENTAL 1 MINUTE: Performed by: UROLOGY

## 2025-02-06 PROCEDURE — 82947 ASSAY GLUCOSE BLOOD QUANT: CPT

## 2025-02-06 PROCEDURE — C1760 CLOSURE DEV, VASC: HCPCS | Performed by: UROLOGY

## 2025-02-06 PROCEDURE — 88307 TISSUE EXAM BY PATHOLOGIST: CPT | Performed by: STUDENT IN AN ORGANIZED HEALTH CARE EDUCATION/TRAINING PROGRAM

## 2025-02-06 PROCEDURE — 2780000003 HC OR 278 NO HCPCS: Performed by: UROLOGY

## 2025-02-06 RX ORDER — NORETHINDRONE AND ETHINYL ESTRADIOL 0.5-0.035
KIT ORAL AS NEEDED
Status: DISCONTINUED | OUTPATIENT
Start: 2025-02-06 | End: 2025-02-06

## 2025-02-06 RX ORDER — HYDROMORPHONE HYDROCHLORIDE 1 MG/ML
1 INJECTION, SOLUTION INTRAMUSCULAR; INTRAVENOUS; SUBCUTANEOUS EVERY 5 MIN PRN
Status: DISCONTINUED | OUTPATIENT
Start: 2025-02-06 | End: 2025-02-06 | Stop reason: HOSPADM

## 2025-02-06 RX ORDER — HEPARIN SODIUM 5000 [USP'U]/ML
5000 INJECTION, SOLUTION INTRAVENOUS; SUBCUTANEOUS EVERY 12 HOURS
Status: DISCONTINUED | OUTPATIENT
Start: 2025-02-07 | End: 2025-02-07 | Stop reason: HOSPADM

## 2025-02-06 RX ORDER — BUPIVACAINE HYDROCHLORIDE 5 MG/ML
INJECTION, SOLUTION EPIDURAL; INTRACAUDAL AS NEEDED
Status: DISCONTINUED | OUTPATIENT
Start: 2025-02-06 | End: 2025-02-06 | Stop reason: HOSPADM

## 2025-02-06 RX ORDER — GLYCOPYRROLATE 0.2 MG/ML
INJECTION INTRAMUSCULAR; INTRAVENOUS AS NEEDED
Status: DISCONTINUED | OUTPATIENT
Start: 2025-02-06 | End: 2025-02-06

## 2025-02-06 RX ORDER — LIDOCAINE HYDROCHLORIDE 20 MG/ML
INJECTION, SOLUTION EPIDURAL; INFILTRATION; INTRACAUDAL; PERINEURAL AS NEEDED
Status: DISCONTINUED | OUTPATIENT
Start: 2025-02-06 | End: 2025-02-06

## 2025-02-06 RX ORDER — INSULIN LISPRO 100 [IU]/ML
0-5 INJECTION, SOLUTION INTRAVENOUS; SUBCUTANEOUS
Status: DISCONTINUED | OUTPATIENT
Start: 2025-02-07 | End: 2025-02-07 | Stop reason: HOSPADM

## 2025-02-06 RX ORDER — METOCLOPRAMIDE HYDROCHLORIDE 5 MG/ML
10 INJECTION INTRAMUSCULAR; INTRAVENOUS ONCE AS NEEDED
Status: DISCONTINUED | OUTPATIENT
Start: 2025-02-06 | End: 2025-02-06 | Stop reason: HOSPADM

## 2025-02-06 RX ORDER — ONDANSETRON 4 MG/1
4 TABLET, FILM COATED ORAL EVERY 8 HOURS PRN
Status: DISCONTINUED | OUTPATIENT
Start: 2025-02-06 | End: 2025-02-07 | Stop reason: HOSPADM

## 2025-02-06 RX ORDER — DOCUSATE SODIUM 100 MG/1
100 CAPSULE, LIQUID FILLED ORAL 2 TIMES DAILY
Status: DISCONTINUED | OUTPATIENT
Start: 2025-02-06 | End: 2025-02-07 | Stop reason: HOSPADM

## 2025-02-06 RX ORDER — FENTANYL CITRATE 50 UG/ML
INJECTION, SOLUTION INTRAMUSCULAR; INTRAVENOUS AS NEEDED
Status: DISCONTINUED | OUTPATIENT
Start: 2025-02-06 | End: 2025-02-06

## 2025-02-06 RX ORDER — HYDRALAZINE HYDROCHLORIDE 20 MG/ML
5 INJECTION INTRAMUSCULAR; INTRAVENOUS EVERY 30 MIN PRN
Status: DISCONTINUED | OUTPATIENT
Start: 2025-02-06 | End: 2025-02-06 | Stop reason: HOSPADM

## 2025-02-06 RX ORDER — HYDROMORPHONE HYDROCHLORIDE 1 MG/ML
INJECTION, SOLUTION INTRAMUSCULAR; INTRAVENOUS; SUBCUTANEOUS AS NEEDED
Status: DISCONTINUED | OUTPATIENT
Start: 2025-02-06 | End: 2025-02-06

## 2025-02-06 RX ORDER — PHENYLEPHRINE HCL IN 0.9% NACL 1 MG/10 ML
SYRINGE (ML) INTRAVENOUS AS NEEDED
Status: DISCONTINUED | OUTPATIENT
Start: 2025-02-06 | End: 2025-02-06

## 2025-02-06 RX ORDER — ONDANSETRON HYDROCHLORIDE 2 MG/ML
4 INJECTION, SOLUTION INTRAVENOUS EVERY 8 HOURS PRN
Status: DISCONTINUED | OUTPATIENT
Start: 2025-02-06 | End: 2025-02-07 | Stop reason: HOSPADM

## 2025-02-06 RX ORDER — CEFAZOLIN SODIUM 1 G/50ML
1 SOLUTION INTRAVENOUS EVERY 8 HOURS
Status: DISCONTINUED | OUTPATIENT
Start: 2025-02-06 | End: 2025-02-07 | Stop reason: HOSPADM

## 2025-02-06 RX ORDER — KETOROLAC TROMETHAMINE 15 MG/ML
15 INJECTION, SOLUTION INTRAMUSCULAR; INTRAVENOUS EVERY 8 HOURS
Status: DISCONTINUED | OUTPATIENT
Start: 2025-02-06 | End: 2025-02-07 | Stop reason: HOSPADM

## 2025-02-06 RX ORDER — SODIUM CHLORIDE, SODIUM GLUCONATE, SODIUM ACETATE, POTASSIUM CHLORIDE AND MAGNESIUM CHLORIDE 30; 37; 368; 526; 502 MG/100ML; MG/100ML; MG/100ML; MG/100ML; MG/100ML
INJECTION, SOLUTION INTRAVENOUS CONTINUOUS PRN
Status: DISCONTINUED | OUTPATIENT
Start: 2025-02-06 | End: 2025-02-06

## 2025-02-06 RX ORDER — PHENYLEPHRINE 10 MG/250 ML(40 MCG/ML)IN 0.9 % SOD.CHLORIDE INTRAVENOUS
CONTINUOUS PRN
Status: DISCONTINUED | OUTPATIENT
Start: 2025-02-06 | End: 2025-02-06

## 2025-02-06 RX ORDER — PROPOFOL 10 MG/ML
INJECTION, EMULSION INTRAVENOUS AS NEEDED
Status: DISCONTINUED | OUTPATIENT
Start: 2025-02-06 | End: 2025-02-06

## 2025-02-06 RX ORDER — INDOCYANINE GREEN AND WATER 25 MG
KIT INJECTION AS NEEDED
Status: DISCONTINUED | OUTPATIENT
Start: 2025-02-06 | End: 2025-02-06

## 2025-02-06 RX ORDER — HYDROCODONE BITARTRATE AND ACETAMINOPHEN 5; 325 MG/1; MG/1
1 TABLET ORAL EVERY 4 HOURS PRN
Status: DISCONTINUED | OUTPATIENT
Start: 2025-02-06 | End: 2025-02-06 | Stop reason: HOSPADM

## 2025-02-06 RX ORDER — ESMOLOL HYDROCHLORIDE 10 MG/ML
INJECTION INTRAVENOUS AS NEEDED
Status: DISCONTINUED | OUTPATIENT
Start: 2025-02-06 | End: 2025-02-06

## 2025-02-06 RX ORDER — CEFAZOLIN SODIUM 2 G/100ML
2 INJECTION, SOLUTION INTRAVENOUS
Status: COMPLETED | OUTPATIENT
Start: 2025-02-06 | End: 2025-02-06

## 2025-02-06 RX ORDER — LABETALOL HYDROCHLORIDE 5 MG/ML
5 INJECTION, SOLUTION INTRAVENOUS
Status: DISCONTINUED | OUTPATIENT
Start: 2025-02-06 | End: 2025-02-06 | Stop reason: HOSPADM

## 2025-02-06 RX ORDER — DIPHENHYDRAMINE HYDROCHLORIDE 50 MG/ML
12.5 INJECTION INTRAMUSCULAR; INTRAVENOUS ONCE AS NEEDED
Status: DISCONTINUED | OUTPATIENT
Start: 2025-02-06 | End: 2025-02-06 | Stop reason: HOSPADM

## 2025-02-06 RX ORDER — ROCURONIUM BROMIDE 50 MG/5 ML
SYRINGE (ML) INTRAVENOUS AS NEEDED
Status: DISCONTINUED | OUTPATIENT
Start: 2025-02-06 | End: 2025-02-06

## 2025-02-06 RX ORDER — SODIUM CHLORIDE, SODIUM LACTATE, POTASSIUM CHLORIDE, CALCIUM CHLORIDE 600; 310; 30; 20 MG/100ML; MG/100ML; MG/100ML; MG/100ML
100 INJECTION, SOLUTION INTRAVENOUS CONTINUOUS
Status: DISCONTINUED | OUTPATIENT
Start: 2025-02-06 | End: 2025-02-06 | Stop reason: HOSPADM

## 2025-02-06 RX ORDER — SODIUM CHLORIDE 9 MG/ML
125 INJECTION, SOLUTION INTRAVENOUS CONTINUOUS
Status: DISCONTINUED | OUTPATIENT
Start: 2025-02-06 | End: 2025-02-07

## 2025-02-06 RX ORDER — HEPARIN SODIUM 5000 [USP'U]/ML
5000 INJECTION, SOLUTION INTRAVENOUS; SUBCUTANEOUS
Status: COMPLETED | OUTPATIENT
Start: 2025-02-06 | End: 2025-02-06

## 2025-02-06 RX ORDER — ONDANSETRON HYDROCHLORIDE 2 MG/ML
INJECTION, SOLUTION INTRAVENOUS AS NEEDED
Status: DISCONTINUED | OUTPATIENT
Start: 2025-02-06 | End: 2025-02-06

## 2025-02-06 RX ORDER — SODIUM CHLORIDE, SODIUM LACTATE, POTASSIUM CHLORIDE, CALCIUM CHLORIDE 600; 310; 30; 20 MG/100ML; MG/100ML; MG/100ML; MG/100ML
INJECTION, SOLUTION INTRAVENOUS CONTINUOUS PRN
Status: DISCONTINUED | OUTPATIENT
Start: 2025-02-06 | End: 2025-02-06

## 2025-02-06 RX ORDER — ALBUTEROL SULFATE 0.83 MG/ML
2.5 SOLUTION RESPIRATORY (INHALATION)
Status: DISCONTINUED | OUTPATIENT
Start: 2025-02-06 | End: 2025-02-06 | Stop reason: HOSPADM

## 2025-02-06 RX ORDER — ACETAMINOPHEN 325 MG/1
975 TABLET ORAL EVERY 6 HOURS
Status: DISCONTINUED | OUTPATIENT
Start: 2025-02-06 | End: 2025-02-07 | Stop reason: HOSPADM

## 2025-02-06 RX ORDER — MIDAZOLAM HYDROCHLORIDE 1 MG/ML
INJECTION, SOLUTION INTRAMUSCULAR; INTRAVENOUS AS NEEDED
Status: DISCONTINUED | OUTPATIENT
Start: 2025-02-06 | End: 2025-02-06

## 2025-02-06 RX ORDER — OXYCODONE HYDROCHLORIDE 5 MG/1
5 TABLET ORAL EVERY 4 HOURS PRN
Status: DISCONTINUED | OUTPATIENT
Start: 2025-02-06 | End: 2025-02-07 | Stop reason: HOSPADM

## 2025-02-06 RX ADMIN — FENTANYL CITRATE 100 MCG: 50 INJECTION, SOLUTION INTRAMUSCULAR; INTRAVENOUS at 11:58

## 2025-02-06 RX ADMIN — INSULIN HUMAN 10 UNITS: 100 INJECTION, SOLUTION PARENTERAL at 17:12

## 2025-02-06 RX ADMIN — EPHEDRINE SULFATE 5 MG: 50 INJECTION, SOLUTION INTRAVENOUS at 14:32

## 2025-02-06 RX ADMIN — Medication 10 MG: at 18:14

## 2025-02-06 RX ADMIN — Medication 10 MG: at 13:09

## 2025-02-06 RX ADMIN — Medication 50 MG: at 11:59

## 2025-02-06 RX ADMIN — Medication 10 MG: at 14:40

## 2025-02-06 RX ADMIN — Medication 200 MCG: at 17:48

## 2025-02-06 RX ADMIN — MIDAZOLAM 2 MG: 1 INJECTION INTRAMUSCULAR; INTRAVENOUS at 11:49

## 2025-02-06 RX ADMIN — KETOROLAC TROMETHAMINE 15 MG: 15 INJECTION, SOLUTION INTRAMUSCULAR; INTRAVENOUS at 21:35

## 2025-02-06 RX ADMIN — Medication 10 MG: at 13:39

## 2025-02-06 RX ADMIN — Medication 10 MG: at 14:06

## 2025-02-06 RX ADMIN — ACETAMINOPHEN 975 MG: 325 TABLET ORAL at 21:48

## 2025-02-06 RX ADMIN — HYDROMORPHONE HYDROCHLORIDE 1 MG: 1 INJECTION, SOLUTION INTRAMUSCULAR; INTRAVENOUS; SUBCUTANEOUS at 20:15

## 2025-02-06 RX ADMIN — HYDROMORPHONE HYDROCHLORIDE 0.5 MG: 1 INJECTION, SOLUTION INTRAMUSCULAR; INTRAVENOUS; SUBCUTANEOUS at 18:12

## 2025-02-06 RX ADMIN — PROPOFOL 200 MG: 10 INJECTION, EMULSION INTRAVENOUS at 11:58

## 2025-02-06 RX ADMIN — INDOCYANINE GREEN AND WATER 0.35 ML: KIT at 17:31

## 2025-02-06 RX ADMIN — CEFAZOLIN SODIUM 2 G: 2 INJECTION, SOLUTION INTRAVENOUS at 12:10

## 2025-02-06 RX ADMIN — DEXAMETHASONE SODIUM PHOSPHATE 4 MG: 4 INJECTION, SOLUTION INTRAMUSCULAR; INTRAVENOUS at 12:17

## 2025-02-06 RX ADMIN — SODIUM CHLORIDE, POTASSIUM CHLORIDE, SODIUM LACTATE AND CALCIUM CHLORIDE: 600; 310; 30; 20 INJECTION, SOLUTION INTRAVENOUS at 15:25

## 2025-02-06 RX ADMIN — Medication 10 MG: at 18:57

## 2025-02-06 RX ADMIN — CEFAZOLIN SODIUM 2 G: 2 INJECTION, SOLUTION INTRAVENOUS at 16:04

## 2025-02-06 RX ADMIN — LIDOCAINE HYDROCHLORIDE 100 MG: 20 INJECTION, SOLUTION EPIDURAL; INFILTRATION; INTRACAUDAL; PERINEURAL at 11:58

## 2025-02-06 RX ADMIN — Medication 200 MCG: at 12:38

## 2025-02-06 RX ADMIN — DEXAMETHASONE SODIUM PHOSPHATE 4 MG: 4 INJECTION, SOLUTION INTRAMUSCULAR; INTRAVENOUS at 12:02

## 2025-02-06 RX ADMIN — Medication 100 MCG: at 16:48

## 2025-02-06 RX ADMIN — EPHEDRINE SULFATE 5 MG: 50 INJECTION, SOLUTION INTRAVENOUS at 13:17

## 2025-02-06 RX ADMIN — Medication 100 MCG: at 12:31

## 2025-02-06 RX ADMIN — ONDANSETRON 4 MG: 2 INJECTION INTRAMUSCULAR; INTRAVENOUS at 18:57

## 2025-02-06 RX ADMIN — EPHEDRINE SULFATE 5 MG: 50 INJECTION, SOLUTION INTRAVENOUS at 16:55

## 2025-02-06 RX ADMIN — ESMOLOL HYDROCHLORIDE 10 MG: 100 INJECTION, SOLUTION INTRAVENOUS at 19:15

## 2025-02-06 RX ADMIN — Medication 10 MG: at 16:07

## 2025-02-06 RX ADMIN — SODIUM CHLORIDE, SODIUM GLUCONATE, SODIUM ACETATE, POTASSIUM CHLORIDE AND MAGNESIUM CHLORIDE: 526; 502; 368; 37; 30 INJECTION, SOLUTION INTRAVENOUS at 11:49

## 2025-02-06 RX ADMIN — INDOCYANINE GREEN AND WATER 0.2 ML: KIT at 17:45

## 2025-02-06 RX ADMIN — Medication 10 MG: at 15:10

## 2025-02-06 RX ADMIN — Medication 50 MG: at 12:39

## 2025-02-06 RX ADMIN — Medication 10 MG: at 14:13

## 2025-02-06 RX ADMIN — ESMOLOL HYDROCHLORIDE 10 MG: 100 INJECTION, SOLUTION INTRAVENOUS at 19:06

## 2025-02-06 RX ADMIN — Medication 20 MG: at 17:32

## 2025-02-06 RX ADMIN — FENTANYL CITRATE 25 MCG: 50 INJECTION, SOLUTION INTRAMUSCULAR; INTRAVENOUS at 15:29

## 2025-02-06 RX ADMIN — Medication 20 MG: at 15:34

## 2025-02-06 RX ADMIN — Medication 100 MCG: at 13:52

## 2025-02-06 RX ADMIN — Medication 10 MG: at 16:31

## 2025-02-06 RX ADMIN — HYDROMORPHONE HYDROCHLORIDE 0.5 MG: 1 INJECTION, SOLUTION INTRAMUSCULAR; INTRAVENOUS; SUBCUTANEOUS at 18:54

## 2025-02-06 RX ADMIN — Medication 200 MCG: at 15:40

## 2025-02-06 RX ADMIN — FENTANYL CITRATE 50 MCG: 50 INJECTION, SOLUTION INTRAMUSCULAR; INTRAVENOUS at 13:46

## 2025-02-06 RX ADMIN — EPHEDRINE SULFATE 10 MG: 50 INJECTION, SOLUTION INTRAVENOUS at 12:55

## 2025-02-06 RX ADMIN — HEPARIN SODIUM 5000 UNITS: 5000 INJECTION, SOLUTION INTRAVENOUS; SUBCUTANEOUS at 10:30

## 2025-02-06 RX ADMIN — Medication 200 MCG: at 15:59

## 2025-02-06 RX ADMIN — ESMOLOL HYDROCHLORIDE 20 MG: 100 INJECTION, SOLUTION INTRAVENOUS at 15:51

## 2025-02-06 RX ADMIN — SODIUM CHLORIDE, SODIUM GLUCONATE, SODIUM ACETATE, POTASSIUM CHLORIDE AND MAGNESIUM CHLORIDE: 526; 502; 368; 37; 30 INJECTION, SOLUTION INTRAVENOUS at 17:26

## 2025-02-06 RX ADMIN — SODIUM CHLORIDE 125 ML/HR: 9 INJECTION, SOLUTION INTRAVENOUS at 21:40

## 2025-02-06 RX ADMIN — HYDROMORPHONE HYDROCHLORIDE 0.5 MG: 1 INJECTION, SOLUTION INTRAMUSCULAR; INTRAVENOUS; SUBCUTANEOUS at 19:54

## 2025-02-06 RX ADMIN — OXYCODONE HYDROCHLORIDE 5 MG: 5 TABLET ORAL at 21:48

## 2025-02-06 RX ADMIN — GLYCOPYRROLATE 0.2 MG: 0.2 INJECTION, SOLUTION INTRAMUSCULAR; INTRAVENOUS at 12:35

## 2025-02-06 RX ADMIN — Medication 200 MCG: at 12:16

## 2025-02-06 RX ADMIN — Medication 20 MG: at 12:35

## 2025-02-06 RX ADMIN — HYDROMORPHONE HYDROCHLORIDE 0.5 MG: 1 INJECTION, SOLUTION INTRAMUSCULAR; INTRAVENOUS; SUBCUTANEOUS at 20:01

## 2025-02-06 RX ADMIN — SODIUM CHLORIDE: 9 INJECTION, SOLUTION INTRAVENOUS at 12:10

## 2025-02-06 RX ADMIN — Medication 10 MG: at 16:42

## 2025-02-06 RX ADMIN — Medication 0.5 MCG/KG/MIN: at 12:15

## 2025-02-06 RX ADMIN — ESMOLOL HYDROCHLORIDE 10 MG: 100 INJECTION, SOLUTION INTRAVENOUS at 19:18

## 2025-02-06 RX ADMIN — ESMOLOL HYDROCHLORIDE 30 MG: 100 INJECTION, SOLUTION INTRAVENOUS at 12:03

## 2025-02-06 RX ADMIN — FENTANYL CITRATE 25 MCG: 50 INJECTION, SOLUTION INTRAMUSCULAR; INTRAVENOUS at 14:40

## 2025-02-06 RX ADMIN — Medication 10 MG: at 13:10

## 2025-02-06 SDOH — HEALTH STABILITY: MENTAL HEALTH: CURRENT SMOKER: 0

## 2025-02-06 ASSESSMENT — PAIN - FUNCTIONAL ASSESSMENT
PAIN_FUNCTIONAL_ASSESSMENT: 0-10

## 2025-02-06 ASSESSMENT — PAIN SCALES - GENERAL
PAINLEVEL_OUTOF10: 0 - NO PAIN
PAINLEVEL_OUTOF10: 10 - WORST POSSIBLE PAIN
PAINLEVEL_OUTOF10: 5 - MODERATE PAIN
PAINLEVEL_OUTOF10: 5 - MODERATE PAIN
PAINLEVEL_OUTOF10: 10 - WORST POSSIBLE PAIN
PAINLEVEL_OUTOF10: 10 - WORST POSSIBLE PAIN
PAINLEVEL_OUTOF10: 5 - MODERATE PAIN
PAINLEVEL_OUTOF10: 7
PAINLEVEL_OUTOF10: 5 - MODERATE PAIN
PAINLEVEL_OUTOF10: 7
PAINLEVEL_OUTOF10: 5 - MODERATE PAIN
PAIN_LEVEL: 5

## 2025-02-06 ASSESSMENT — PAIN DESCRIPTION - LOCATION: LOCATION: ABDOMEN

## 2025-02-06 ASSESSMENT — COLUMBIA-SUICIDE SEVERITY RATING SCALE - C-SSRS
1. IN THE PAST MONTH, HAVE YOU WISHED YOU WERE DEAD OR WISHED YOU COULD GO TO SLEEP AND NOT WAKE UP?: NO
6. HAVE YOU EVER DONE ANYTHING, STARTED TO DO ANYTHING, OR PREPARED TO DO ANYTHING TO END YOUR LIFE?: NO
2. HAVE YOU ACTUALLY HAD ANY THOUGHTS OF KILLING YOURSELF?: NO

## 2025-02-06 ASSESSMENT — PAIN DESCRIPTION - ORIENTATION
ORIENTATION: RIGHT
ORIENTATION: RIGHT

## 2025-02-06 ASSESSMENT — PAIN DESCRIPTION - DESCRIPTORS
DESCRIPTORS: ACHING;SORE

## 2025-02-06 NOTE — ANESTHESIA PROCEDURE NOTES
Arterial Line:    Date/Time: 2/6/2025 12:04 PM    Staffing  Performed: attending   Authorized by: Cecy Paul MD    Performed by: JOE Trinh-CRNA    An arterial line was placed. Procedure performed using surface landmarks.in the OR for the following indication(s): continuous blood pressure monitoring and blood sampling needed.    A 20 gauge (size), 1 and 3/4 inch (length), Arrow (type) catheter was placed into the Right radial artery, secured by Tegaderm and Biopatch,   Seldinger technique used.  Events:  patient tolerated procedure well with no complications.

## 2025-02-06 NOTE — ANESTHESIA PROCEDURE NOTES
Peripheral IV  Date/Time: 2/6/2025 12:10 PM  Inserted by: Carlos Keane    Placement  Needle size: 18 G  Laterality: right  Location: hand  Local anesthetic: none  Site prep: chlorhexidine  Technique: anatomical landmarks  Attempts: 1

## 2025-02-06 NOTE — ANESTHESIA PROCEDURE NOTES
Airway  Date/Time: 2/6/2025 12:01 PM  Urgency: elective    Airway not difficult    Staffing  Performed: SRNA   Authorized by: Cecy Paul MD    Performed by: Carlos Keane  Patient location during procedure: OR    Indications and Patient Condition  Indications for airway management: anesthesia  Sedation level: deep  Preoxygenated: yes  Patient position: sniffing  Mask difficulty assessment: 2 - vent by mask + OA or adjuvant +/- NMBA    Final Airway Details  Final airway type: endotracheal airway      Successful airway: ETT  Cuffed: yes   Successful intubation technique: video laryngoscopy  Facilitating devices/methods: intubating stylet  Endotracheal tube insertion site: oral  Blade type: NOSTROMO ICT.  Blade size: #4  ETT size (mm): 8.0  Cormack-Lehane Classification: grade I - full view of glottis  Placement verified by: chest auscultation and capnometry   Cuff volume (mL): 8  Measured from: lips  ETT to lips (cm): 23  Number of attempts at approach: 1  Number of other approaches attempted: 0    Additional Comments  Lips and teeth in preanesthetic condition

## 2025-02-06 NOTE — OP NOTE
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]     Procedures  Laparoscopic lysis of adhesions    Surgeons      * Yimi Arana - Primary    Resident/Fellow/Other Assistant:  Surgeons and Role:     * Ab White MD - Assisting    Staff:   Circulator: Mariel  Circulator: Liya Nguyễn Person: Amanda  Surgical Assistant: April    Anesthesia Staff: Anesthesiologist: Cecy Paul MD  CRNA: JOE Trinh-MYKE  SRNA: Carlos Keane  Frontline Breaker: JORDAN Apple    Procedure Summary  Anesthesia: General  ASA: II  Estimated Blood Loss: 0mL  Intra-op Medications:   Administrations occurring from 1130 to 1625 on 25:   Medication Name Total Dose   dexAMETHasone (Decadron) injection 4 mg/mL 8 mg   electrolyte-A (Plasmalyte-A) Cannot be calculated   ePHEDrine injection 20 mg   esmolol (Brevibloc) injection 50 mg   fentaNYL (Sublimaze) injection 50 mcg/mL 200 mcg   glycopyrrolate (Robinul) injection 0.2 mg   ketamine injection 50 mg/ 5 mL (10 mg/mL) 50 mg   LR infusion Cannot be calculated   lidocaine PF (Xylocaine-MPF) local injection 2 % 100 mg   midazolam (Versed) injection 1 mg/mL 2 mg   phenylephrine (David-Synephrine) 10 mg/250 mL NS (40 mcg/mL) infusion 5.16 mg   phenylephrine 100 mcg/mL syringe 10 mL (prefilled) 800 mcg   propofol (Diprivan) injection 10 mg/mL 200 mg   rocuronium (Zemuron) 50 mg/5 mL prefilled syringe 170 mg   NaCl 0.9 % bolus Cannot be calculated   ceFAZolin (Ancef) 2 g in dextrose (iso)  mL 2 g              Anesthesia Record               Intraprocedure I/O Totals          Intake    electrolyte-A (Plasmalyte-A) 1000.00 mL    Phenylephrine Drip  0.00 mL    The total shown is the total volume documented since Anesthesia Start was filed.    ceFAZolin (Ancef) 2 g in dextrose (iso)  mL 100.00 mL    Total Intake 1100 mL       Output    Urine 275 mL    Total Output 275 mL       Net    Net Volume 825 mL          Specimen: No specimens collected              Drains and/or Catheters:   Urethral Catheter Non-latex 16 Fr. (Active)       Tourniquet Times:         Implants:     Findings: Extensive adhesions between the small bowel and anterior abdominal wall    Indications: Dean Alfaro is an 49 y.o. 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     Procedure Details: Intraoperative consult was placed for assessment of segment of small bowel and assistance with adhesiolysis.  At the time of my arrival, patient was already positioned in a left lateral decubitus position on the operating room table.  There were multiple laparoscopic ports already inserted into the abdomen.  Laparoscopic evaluation of the abdomen demonstrated extensive amount of adhesions involving the small bowel and anterior abdominal wall.  Some adhesiolysis had already been performed.  In the far right lower quadrant of the abdomen, there was a limb of small bowel that had been lysed off the abdominal wall.  There was a very superficial serosal tear.  This occurred during cold adhesiolysis using the laparoscopic Metzenbaums scissors.  It was of no clinical consequence.  Due to the very superficial nature of this tear and the absence of thermal injury, primary repair was deemed not necessary.      I did assist with additional sharp laparoscopic Metzenbaum adhesiolysis along the midline of the lower abdominal wall.  This was performed to separate small bowel that was adhesed against the anterior abdominal wall and thus facilitate insertion of another laparoscopic port.  After adequate space had been created, a needle was used to verify a safe intended  course for port insertion.  The final 12 mm port was then inserted under direct laparoscopic view and there were no injuries incurred to the intra-abdominal viscera with insertion.  At this point, I scrubbed out of the case.  Please see the urology operative note for details before my arrival and after my departure.    Additional Details: N/A    Ab White MD   2/6/2025  3:53 PM

## 2025-02-06 NOTE — ANESTHESIA PREPROCEDURE EVALUATION
Patient: Dean Alfaro    Procedure Information       Anesthesia Start Date/Time: 02/06/25 1149    Procedure: RIGHT ROBOTIC PARTIAL NEPHRECTOMY WITH INTRA OP ULTRASOUND (Right)    Location: [facility] OR 08 / Virtual [facility] OR    Surgeons: Yimi Arana MD            Relevant Problems   No relevant active problems       Clinical information reviewed:   Tobacco  Allergies  Meds   Med Hx  Surg Hx   Fam Hx  Soc Hx        NPO Detail:  NPO/Void Status  Carbohydrate Drink Given Prior to Surgery? : N  Date of Last Liquid: 02/05/25  Time of Last Liquid: 2330  Date of Last Solid: 02/05/25  Time of Last Solid: 2300  Last Intake Type: Food  Time of Last Void: 1007         Physical Exam    Airway  Mallampati: II  TM distance: >3 FB  Neck ROM: full     Cardiovascular   Rhythm: regular  Rate: normal     Dental - normal exam     Pulmonary   Breath sounds clear to auscultation     Abdominal     Comments: Well healed scar post laparotomy           Anesthesia Plan    History of general anesthesia?: yes  History of complications of general anesthesia?: no    ASA 2     general   (Invasive BP monitoring)  The patient is not a current smoker.    intravenous induction   Anesthetic plan and risks discussed with patient.    Plan discussed with CRNA.

## 2025-02-07 ENCOUNTER — APPOINTMENT (OUTPATIENT)
Dept: CARDIOLOGY | Facility: HOSPITAL | Age: 50
End: 2025-02-07
Payer: COMMERCIAL

## 2025-02-07 VITALS
DIASTOLIC BLOOD PRESSURE: 90 MMHG | BODY MASS INDEX: 33.59 KG/M2 | WEIGHT: 248 LBS | TEMPERATURE: 98.8 F | SYSTOLIC BLOOD PRESSURE: 142 MMHG | OXYGEN SATURATION: 94 % | RESPIRATION RATE: 18 BRPM | HEIGHT: 72 IN | HEART RATE: 108 BPM

## 2025-02-07 LAB
ANION GAP SERPL CALC-SCNC: 12 MMOL/L (ref 10–20)
BUN SERPL-MCNC: 15 MG/DL (ref 6–23)
CALCIUM SERPL-MCNC: 8 MG/DL (ref 8.6–10.3)
CHLORIDE SERPL-SCNC: 102 MMOL/L (ref 98–107)
CO2 SERPL-SCNC: 25 MMOL/L (ref 21–32)
CREAT FLD-MCNC: 1.4 MG/DL
CREAT SERPL-MCNC: 1.28 MG/DL (ref 0.5–1.3)
EGFRCR SERPLBLD CKD-EPI 2021: 68 ML/MIN/1.73M*2
ERYTHROCYTE [DISTWIDTH] IN BLOOD BY AUTOMATED COUNT: 13.4 % (ref 11.5–14.5)
GLUCOSE BLD MANUAL STRIP-MCNC: 152 MG/DL (ref 74–99)
GLUCOSE BLD MANUAL STRIP-MCNC: 161 MG/DL (ref 74–99)
GLUCOSE BLD MANUAL STRIP-MCNC: 195 MG/DL (ref 74–99)
GLUCOSE SERPL-MCNC: 148 MG/DL (ref 74–99)
HCT VFR BLD AUTO: 42.1 % (ref 41–52)
HGB BLD-MCNC: 14 G/DL (ref 13.5–17.5)
MCH RBC QN AUTO: 28.7 PG (ref 26–34)
MCHC RBC AUTO-ENTMCNC: 33.3 G/DL (ref 32–36)
MCV RBC AUTO: 86 FL (ref 80–100)
NRBC BLD-RTO: 0 /100 WBCS (ref 0–0)
PLATELET # BLD AUTO: 181 X10*3/UL (ref 150–450)
POTASSIUM SERPL-SCNC: 3.9 MMOL/L (ref 3.5–5.3)
RBC # BLD AUTO: 4.87 X10*6/UL (ref 4.5–5.9)
SODIUM SERPL-SCNC: 135 MMOL/L (ref 136–145)
WBC # BLD AUTO: 10.2 X10*3/UL (ref 4.4–11.3)

## 2025-02-07 PROCEDURE — 36415 COLL VENOUS BLD VENIPUNCTURE: CPT | Performed by: UROLOGY

## 2025-02-07 PROCEDURE — 99222 1ST HOSP IP/OBS MODERATE 55: CPT | Performed by: STUDENT IN AN ORGANIZED HEALTH CARE EDUCATION/TRAINING PROGRAM

## 2025-02-07 PROCEDURE — 96372 THER/PROPH/DIAG INJ SC/IM: CPT | Performed by: UROLOGY

## 2025-02-07 PROCEDURE — 80048 BASIC METABOLIC PNL TOTAL CA: CPT | Performed by: UROLOGY

## 2025-02-07 PROCEDURE — 93005 ELECTROCARDIOGRAM TRACING: CPT

## 2025-02-07 PROCEDURE — 82374 ASSAY BLOOD CARBON DIOXIDE: CPT | Performed by: UROLOGY

## 2025-02-07 PROCEDURE — 2500000004 HC RX 250 GENERAL PHARMACY W/ HCPCS (ALT 636 FOR OP/ED)

## 2025-02-07 PROCEDURE — 2500000004 HC RX 250 GENERAL PHARMACY W/ HCPCS (ALT 636 FOR OP/ED): Performed by: UROLOGY

## 2025-02-07 PROCEDURE — 82570 ASSAY OF URINE CREATININE: CPT | Mod: STJLAB | Performed by: UROLOGY

## 2025-02-07 PROCEDURE — 85027 COMPLETE CBC AUTOMATED: CPT | Performed by: UROLOGY

## 2025-02-07 PROCEDURE — 2500000001 HC RX 250 WO HCPCS SELF ADMINISTERED DRUGS (ALT 637 FOR MEDICARE OP): Performed by: UROLOGY

## 2025-02-07 PROCEDURE — 7100000011 HC EXTENDED STAY RECOVERY HOURLY - NURSING UNIT

## 2025-02-07 PROCEDURE — 82947 ASSAY GLUCOSE BLOOD QUANT: CPT

## 2025-02-07 RX ORDER — OXYCODONE HYDROCHLORIDE 5 MG/1
5 TABLET ORAL EVERY 6 HOURS PRN
Qty: 15 TABLET | Refills: 0 | Status: SHIPPED | OUTPATIENT
Start: 2025-02-07 | End: 2025-02-12

## 2025-02-07 RX ORDER — IBUPROFEN 600 MG/1
TABLET ORAL
Qty: 30 TABLET | Refills: 1 | Status: SHIPPED | OUTPATIENT
Start: 2025-02-07

## 2025-02-07 RX ORDER — ACETAMINOPHEN 325 MG/1
TABLET ORAL
Qty: 30 TABLET | Refills: 1 | Status: SHIPPED | OUTPATIENT
Start: 2025-02-07

## 2025-02-07 RX ORDER — MORPHINE SULFATE 2 MG/ML
2 INJECTION, SOLUTION INTRAMUSCULAR; INTRAVENOUS
Status: DISCONTINUED | OUTPATIENT
Start: 2025-02-07 | End: 2025-02-07 | Stop reason: HOSPADM

## 2025-02-07 RX ADMIN — OXYCODONE HYDROCHLORIDE 5 MG: 5 TABLET ORAL at 15:38

## 2025-02-07 RX ADMIN — OXYCODONE HYDROCHLORIDE 5 MG: 5 TABLET ORAL at 06:39

## 2025-02-07 RX ADMIN — ACETAMINOPHEN 975 MG: 325 TABLET ORAL at 17:31

## 2025-02-07 RX ADMIN — CEFAZOLIN SODIUM 1 G: 1 INJECTION, SOLUTION INTRAVENOUS at 08:50

## 2025-02-07 RX ADMIN — ONDANSETRON HYDROCHLORIDE 4 MG: 4 TABLET, FILM COATED ORAL at 17:31

## 2025-02-07 RX ADMIN — ACETAMINOPHEN 975 MG: 325 TABLET ORAL at 10:59

## 2025-02-07 RX ADMIN — ACETAMINOPHEN 975 MG: 325 TABLET ORAL at 04:45

## 2025-02-07 RX ADMIN — CEFAZOLIN SODIUM 1 G: 1 INJECTION, SOLUTION INTRAVENOUS at 00:35

## 2025-02-07 RX ADMIN — HEPARIN SODIUM 5000 UNITS: 5000 INJECTION, SOLUTION INTRAVENOUS; SUBCUTANEOUS at 13:34

## 2025-02-07 RX ADMIN — KETOROLAC TROMETHAMINE 15 MG: 15 INJECTION, SOLUTION INTRAMUSCULAR; INTRAVENOUS at 05:46

## 2025-02-07 RX ADMIN — OXYCODONE HYDROCHLORIDE 5 MG: 5 TABLET ORAL at 10:59

## 2025-02-07 RX ADMIN — KETOROLAC TROMETHAMINE 15 MG: 15 INJECTION, SOLUTION INTRAMUSCULAR; INTRAVENOUS at 13:35

## 2025-02-07 RX ADMIN — OXYCODONE HYDROCHLORIDE 5 MG: 5 TABLET ORAL at 02:35

## 2025-02-07 RX ADMIN — CEFAZOLIN SODIUM 1 G: 1 INJECTION, SOLUTION INTRAVENOUS at 15:38

## 2025-02-07 RX ADMIN — MORPHINE SULFATE 2 MG: 2 INJECTION, SOLUTION INTRAMUSCULAR; INTRAVENOUS at 04:45

## 2025-02-07 SDOH — SOCIAL STABILITY: SOCIAL INSECURITY: WITHIN THE LAST YEAR, HAVE YOU BEEN AFRAID OF YOUR PARTNER OR EX-PARTNER?: NO

## 2025-02-07 SDOH — ECONOMIC STABILITY: HOUSING INSECURITY: IN THE PAST 12 MONTHS, HOW MANY TIMES HAVE YOU MOVED WHERE YOU WERE LIVING?: 0

## 2025-02-07 SDOH — ECONOMIC STABILITY: FOOD INSECURITY: HOW HARD IS IT FOR YOU TO PAY FOR THE VERY BASICS LIKE FOOD, HOUSING, MEDICAL CARE, AND HEATING?: NOT VERY HARD

## 2025-02-07 SDOH — SOCIAL STABILITY: SOCIAL INSECURITY
WITHIN THE LAST YEAR, HAVE YOU BEEN RAPED OR FORCED TO HAVE ANY KIND OF SEXUAL ACTIVITY BY YOUR PARTNER OR EX-PARTNER?: NO

## 2025-02-07 SDOH — ECONOMIC STABILITY: FOOD INSECURITY: WITHIN THE PAST 12 MONTHS, THE FOOD YOU BOUGHT JUST DIDN'T LAST AND YOU DIDN'T HAVE MONEY TO GET MORE.: NEVER TRUE

## 2025-02-07 SDOH — ECONOMIC STABILITY: FOOD INSECURITY: WITHIN THE PAST 12 MONTHS, YOU WORRIED THAT YOUR FOOD WOULD RUN OUT BEFORE YOU GOT THE MONEY TO BUY MORE.: NEVER TRUE

## 2025-02-07 SDOH — HEALTH STABILITY: MENTAL HEALTH: HOW OFTEN DO YOU HAVE SIX OR MORE DRINKS ON ONE OCCASION?: LESS THAN MONTHLY

## 2025-02-07 SDOH — ECONOMIC STABILITY: INCOME INSECURITY: IN THE PAST 12 MONTHS HAS THE ELECTRIC, GAS, OIL, OR WATER COMPANY THREATENED TO SHUT OFF SERVICES IN YOUR HOME?: NO

## 2025-02-07 SDOH — HEALTH STABILITY: MENTAL HEALTH: HOW OFTEN DO YOU HAVE A DRINK CONTAINING ALCOHOL?: MONTHLY OR LESS

## 2025-02-07 SDOH — ECONOMIC STABILITY: HOUSING INSECURITY: AT ANY TIME IN THE PAST 12 MONTHS, WERE YOU HOMELESS OR LIVING IN A SHELTER (INCLUDING NOW)?: NO

## 2025-02-07 SDOH — SOCIAL STABILITY: SOCIAL INSECURITY: WITHIN THE LAST YEAR, HAVE YOU BEEN HUMILIATED OR EMOTIONALLY ABUSED IN OTHER WAYS BY YOUR PARTNER OR EX-PARTNER?: NO

## 2025-02-07 SDOH — ECONOMIC STABILITY: HOUSING INSECURITY: IN THE LAST 12 MONTHS, WAS THERE A TIME WHEN YOU WERE NOT ABLE TO PAY THE MORTGAGE OR RENT ON TIME?: NO

## 2025-02-07 SDOH — HEALTH STABILITY: MENTAL HEALTH: HOW MANY DRINKS CONTAINING ALCOHOL DO YOU HAVE ON A TYPICAL DAY WHEN YOU ARE DRINKING?: 1 OR 2

## 2025-02-07 SDOH — SOCIAL STABILITY: SOCIAL INSECURITY: ABUSE: ADULT

## 2025-02-07 SDOH — SOCIAL STABILITY: SOCIAL INSECURITY: DO YOU FEEL UNSAFE GOING BACK TO THE PLACE WHERE YOU ARE LIVING?: NO

## 2025-02-07 SDOH — SOCIAL STABILITY: SOCIAL INSECURITY: WERE YOU ABLE TO COMPLETE ALL THE BEHAVIORAL HEALTH SCREENINGS?: YES

## 2025-02-07 SDOH — ECONOMIC STABILITY: TRANSPORTATION INSECURITY: IN THE PAST 12 MONTHS, HAS LACK OF TRANSPORTATION KEPT YOU FROM MEDICAL APPOINTMENTS OR FROM GETTING MEDICATIONS?: NO

## 2025-02-07 SDOH — SOCIAL STABILITY: SOCIAL INSECURITY
WITHIN THE LAST YEAR, HAVE YOU BEEN KICKED, HIT, SLAPPED, OR OTHERWISE PHYSICALLY HURT BY YOUR PARTNER OR EX-PARTNER?: NO

## 2025-02-07 SDOH — SOCIAL STABILITY: SOCIAL INSECURITY: DOES ANYONE TRY TO KEEP YOU FROM HAVING/CONTACTING OTHER FRIENDS OR DOING THINGS OUTSIDE YOUR HOME?: NO

## 2025-02-07 SDOH — SOCIAL STABILITY: SOCIAL INSECURITY: DO YOU FEEL ANYONE HAS EXPLOITED OR TAKEN ADVANTAGE OF YOU FINANCIALLY OR OF YOUR PERSONAL PROPERTY?: NO

## 2025-02-07 SDOH — SOCIAL STABILITY: SOCIAL INSECURITY: ARE THERE ANY APPARENT SIGNS OF INJURIES/BEHAVIORS THAT COULD BE RELATED TO ABUSE/NEGLECT?: NO

## 2025-02-07 SDOH — SOCIAL STABILITY: SOCIAL INSECURITY: ARE YOU OR HAVE YOU BEEN THREATENED OR ABUSED PHYSICALLY, EMOTIONALLY, OR SEXUALLY BY ANYONE?: NO

## 2025-02-07 SDOH — SOCIAL STABILITY: SOCIAL INSECURITY: HAVE YOU HAD THOUGHTS OF HARMING ANYONE ELSE?: NO

## 2025-02-07 SDOH — SOCIAL STABILITY: SOCIAL INSECURITY: HAS ANYONE EVER THREATENED TO HURT YOUR FAMILY OR YOUR PETS?: NO

## 2025-02-07 ASSESSMENT — COGNITIVE AND FUNCTIONAL STATUS - GENERAL
MOBILITY SCORE: 24
MOBILITY SCORE: 24
DAILY ACTIVITIY SCORE: 24
PATIENT BASELINE BEDBOUND: NO

## 2025-02-07 ASSESSMENT — PAIN DESCRIPTION - LOCATION
LOCATION: ABDOMEN

## 2025-02-07 ASSESSMENT — PAIN SCALES - GENERAL
PAINLEVEL_OUTOF10: 7
PAINLEVEL_OUTOF10: 6
PAINLEVEL_OUTOF10: 5 - MODERATE PAIN
PAINLEVEL_OUTOF10: 6
PAINLEVEL_OUTOF10: 5 - MODERATE PAIN

## 2025-02-07 ASSESSMENT — ACTIVITIES OF DAILY LIVING (ADL)
WALKS IN HOME: INDEPENDENT
HEARING - LEFT EAR: FUNCTIONAL
LACK_OF_TRANSPORTATION: NO
TOILETING: INDEPENDENT
JUDGMENT_ADEQUATE_SAFELY_COMPLETE_DAILY_ACTIVITIES: YES
BATHING: INDEPENDENT
ADEQUATE_TO_COMPLETE_ADL: YES
LACK_OF_TRANSPORTATION: NO
HEARING - RIGHT EAR: FUNCTIONAL
PATIENT'S MEMORY ADEQUATE TO SAFELY COMPLETE DAILY ACTIVITIES?: YES
GROOMING: INDEPENDENT
FEEDING YOURSELF: INDEPENDENT
DRESSING YOURSELF: INDEPENDENT

## 2025-02-07 ASSESSMENT — PAIN DESCRIPTION - ORIENTATION
ORIENTATION: RIGHT

## 2025-02-07 ASSESSMENT — PATIENT HEALTH QUESTIONNAIRE - PHQ9
2. FEELING DOWN, DEPRESSED OR HOPELESS: NOT AT ALL
1. LITTLE INTEREST OR PLEASURE IN DOING THINGS: NOT AT ALL
SUM OF ALL RESPONSES TO PHQ9 QUESTIONS 1 & 2: 0

## 2025-02-07 ASSESSMENT — PAIN - FUNCTIONAL ASSESSMENT
PAIN_FUNCTIONAL_ASSESSMENT: 0-10
PAIN_FUNCTIONAL_ASSESSMENT: 0-10

## 2025-02-07 ASSESSMENT — LIFESTYLE VARIABLES
AUDIT-C TOTAL SCORE: 2
SKIP TO QUESTIONS 9-10: 0

## 2025-02-07 NOTE — OP NOTE
Operative Note    Location: [facility] [place], [place], [place]    Date: 2/6/2025    Dean Alfaro  [id number]    Surgeon: Yimi Arana MD    Assistant: Surgeons and Role:     * Ab White MD - Assisting  April Case    Pre-operative diagnosis: Right renal mass    Post-operative diagnosis: Right renal mass    Procedure performed: Robotic right partial nephrectomy with intraoperative ultrasound guidance, extensive lysis of adhesions (modifier-22 for 3 hour lysis of adhesions)    Anesthesia: General    Estimated blood loss: 150 mL     Complications: none    Specimens: Right renal mass    Drains: 16 Indonesian Wade catheter, 15 Syriac lalito drain     Indications: Dean Alfaro is a 49 y.o. male who was found to have a 4 cm right interpolar posterior renal lesion.  He has elected for robotic partial nephrectomy.  The patient presents today for his procedure.  Of note, patient previously had 4 prior abdominal surgeries for ulcerative colitis including total colectomy, ileostomy creation, J-pouch creation, and later ileostomy reversal.    Operative summary:   The risks and benefits of the procedure explained to the patient in the preoperative area and after informed consent was obtained the patient was taken back to the operative room.  The patient received 5000 units of subcutaneous heparin.  The patient was transferred to the operating table and placed in the supine position. Prior to induction of anesthesia, EPC cuffs were on and functioning.  The patient received Ancef intravenously.  General anesthesia was induced and the patient was placed in the left lateral decubitus position. All pressure points were padded and the patient was secured in position. The patient was prepped and draped in a sterile fashion and a time-out was performed to confirm patient identity, procedure, and laterality.  A Veress needle was used to obtain pneumoperitoneum.  A transverse incision was made in the right upper quadrant of the  abdomen using a 15 blade scalpel.  An 8 mm Visiport was then placed through this incision.   Upon entering the abdomen, dense adhesions were noted and no normal anatomical landmarks were able to visualize using the camera port and working laterally with no bowel adhesions to the anterior abdominal wall were noted, a space was created in order to place a second laparoscopic port.  A 5 mm trocar was placed.  I then meticulously began lysing adhesions between small bowel and anterior abdominal wall as well as small bowel mesentery and omentum to anterior abdominal wall.  The liver was also adhesed to the anterior abdominal wall and these adhesions were sharply divided.  After approximately 3 hours lysis of adhesions, the four 8 mm robotic ports were able to be placed.  I also placed an 5 mm liver retractor port.  I asked Dr. Ab White to evaluate a small very superficial serosal tear on one of the loops of bowel that had been lysed off of the anterior abdominal wall.  This was deemed to be of no clinical consequence and he reported no repair was necessary.  I did later place a superficial implicating stitch with the robot using 3-0 Vicryl suture in a longitudinal direction.  Dr. White then performed additional lysis of adhesions to allow me to place a 12 mm assistant port near the midline adjacent to a prior exploratory laparotomy scar.  He then exited the operating room and we continued with our procedure.  The robot was docked and additional lysis of adhesions was performed for approximately 15 minutes.  Duodenum and additional small bowel loops were then kocherized medially in order to revealed the inferior vena cava and gonadal vessels.  The ureter was identified and lifted laterally with the lower pole of the kidney while allowing the gonadal vein to remain medial.  The dissection then continued superiorly towards the hilum. The hilum was meticulously dissected and the renal vein and artery were identified.  Two  renal arteries and 1 renal vein were identified.  The kidney was then fully mobilized in order to visualize the posterior interpolar renal mass.  The kidney was defatted over the mass and it ultrasound probe was inserted in order to apolinar the peripheral margins of the mass using monopolar cautery.  ICG was administered in order to provide differential enhancement and assist with margins status.  Two bulldog clamps were placed across the renal arteries.  1 bulldog clamp was placed across the renal vein.   The mass was then excised using sharp dissection with scissors.  The base of the resection site was closed using 3 running stitches with 3-0 V-lock suture. The edges of the renal cortex were then reapproximated using 1-Vicryl suture. A total of 5 stitches were used.  Weck clips and lapraties were used to secure the free ends of the suture. The bulldog clamps were removed.  Warm ischemia time was 26 minutes.  Tisseel was applied to the resection bed.  Edges of Gerotas fascia were reapproximated using a running stitch with 3-0 V-lock suture. No active bleeding was noted.  Surgicel was applied around the renal hilum.  The right renal tumor was placed into an Endo-Catch bag.  A 15 Malay lalito drain was placed and secure in position using a 3-0 nylon suture. The robot was undocked.  The specimen was extracted through a left lower quadrant abdominal incision.  Fascia was closed using figure-of-eight stitches with #1 Vicryl suture. Skin edges were reapproximated using subcuticular stitches with 4-0 Monocryl suture followed by Dermabond skin glue.  The patient was woken and transferred to PACU.  All instruments and equipment were accounted for at the end of the procedure.     Disposition:  The patient was transferred to PACU in good condition.    Follow-up:  The patient will be admitted to the floor for monitoring.    - Yimi Arana MD   02/06/25 8:02 PM     The physician assistant bedside assisted throughout the entire  procedure.   As a private practice urologist, residents are not involved in the care of my patients.

## 2025-02-07 NOTE — PROGRESS NOTES
Spiritual Care Visit  Spiritual Care Request    Reason for Visit:  Routine Visit: Introduction  Continue Visiting: No     Request Received From:       Focus of Care:  Visited With: Patient and family together         Refer to :          Spiritual Care Assessment    Spiritual Assessment:                      Care Provided:  Intended Effects: Establish rapport and connectedness, Janice affirmation, Build relationship of care and support, Demonstrate caring and concern    Sense of Community and or Advent Affiliation:  Taoism         Addressed Needs/Concerns and/or Pushpa Through:  Advent Encounters  Advent Needs: Prayer, Literature  Sacramental Encounters  Communion: Ask the patient  Communion Given Indicator: No  Sacrament of Sick-Anointing: Patient requested anointing, Anointed    Outcome:  Outcome of Spiritual Care Visit: [place], Affirmation     Advance Directives:         Spiritual Care Annotation    Annotation:

## 2025-02-07 NOTE — PROGRESS NOTES
PaSpiritual Care Visit  Spiritual Care Request    Reason for Visit:  Routine Visit: Introduction     Request Received From:       Focus of Care:  Visited With: Patient         Refer to :          Spiritual Care Assessment    Spiritual Assessment:                      Care Provided:  Intended Effects: Promote sense of peace, Preserve dignity and respect, Meaning-making  Methods: Offer spiritual/Buddhist support  Interventions: Share words of hope and inspiration, [place]    Sense of Community and or Mu-ism Affiliation:  Hoahaoism         Addressed Needs/Concerns and/or Pushpa Through:          Outcome:        Advance Directives:         Spiritual Care Annotation    Annotation:  Patient was with his wife.   listened to his story and prayed at his request.

## 2025-02-07 NOTE — DISCHARGE INSTRUCTIONS
1. Please call your physician or go to the emergency department if any of the following occur:  Fever, unrelieved pain, intractable vomiting, bleeding, drainage from wound.  2. Please follow-up in 3 weeks with Dr. Arana. Call [phone] for appt.   3. Do not lift more than 10 pounds for 6 weeks.  4. Patient may not drive while on narcotic pain medications.  5. Patient may shower. Do not scrub incisions.  6. Patient may resume a regular diet.   7. Use ibuprofen 600 mg every 8 hours for 3 days scheduled, then use as needed.   8. Use tylenol 650 mg every 6 hours for 3 days scheduled, then as needed.   9. Use oxycodone as needed as prescribed.   10. You have a waterproof dressing in place over incision. Ok to remove in 3 days. If incision has not closed, cover with a bandage and change as needed.

## 2025-02-07 NOTE — CONSULTS
Inpatient consult to Medicine  Consult performed by: Ace Haas DO  Consult ordered by: Yimi Arana MD          Reason For Consult  Tachycardia    History Of Present Illness  Dean Alfaro is a 49 y.o. male who underwent robotoc right partial nephrectomy. Post op, doing well. Rates pain 7/10. Medicine consulted for tachycardia. Patient reports baseline tachycardia, with heart rates in low 100s-110s at rest. Asymptomatic, Post op, highest reported heart rate 116bpm. Remains asymptomatic.     Past Medical History  He has a past medical history of Asthma, Eosinophilic esophagitis, GERD (gastroesophageal reflux disease), History of kidney cancer (2024), Marcin's syndrome, Personal history of other specified conditions (07/16/2019), Rheumatoid arthritis, Type 2 diabetes mellitus, and Ulcerative colitis.    Surgical History  He has a past surgical history that includes Other surgical history (07/16/2019); Ileostomy; Ileostomy closure; Tonsillectomy; and Nephrectomy (Right, 02/06/2025).     Social History  He reports that he has never smoked. He has never used smokeless tobacco. He reports current alcohol use. He reports that he does not use drugs.    Family History  Family History   Problem Relation Name Age of Onset    Cancer Mother      Heart disease Father      Cancer Father          Allergies  Patient has no known allergies.    Review of Systems  10 point ROS negative aside from HPI     Physical Exam  Constitutional: Negative for fever, chills, or sweats    ENMT: Negative for nasal discharge        Respiratory: Negative for cough, wheezing, shortness of breath       Cardiac: Negative for chest pain, dyspnea on exertion, palpitations   Gastrointestinal: Negative for nausea, vomiting, diarrhea  Genitourinary: Negative for dysuria, flank pain, frequency, hematuria          Musculoskeletal: Negative for decreased ROM, pain, swelling, weakness     Neurological: Negative for dizziness, confusion,  headache  Psychiatric: Negative for mood changes            Hematologic/Lymph: Negative for bruising, easy bleeding  Allergic/Immunologic: Negative itching, sneezing, swelling        Last Recorded Vitals  BP (!) 154/99 (BP Location: Left arm, Patient Position: Sitting)   Pulse (!) 111   Temp 36.7 °C (98.1 °F) (Temporal)   Resp 18   Wt 112 kg (248 lb)   SpO2 95%     Relevant Results       Assessment/Plan     Tachycardia    -With baseline history of tachycardia. Reports resting HR in low 100s-110s. Post op heart rates similar. Asymptomatic. No hx of tachyarrhythmias. No chest pain, sob or palpitations. EKG 2/7/25 with sinus tachycardia. As asymptomatic, would defer on initiation of beta blockade and would recommend continued outpatient follow up.      60 minutes of care time spent.    Ace Haas,

## 2025-02-07 NOTE — ANESTHESIA POSTPROCEDURE EVALUATION
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    Anesthesia Post Evaluation    Patient location during evaluation: PACU  Patient participation: complete - patient participated  Level of consciousness: awake, sleepy but conscious and responsive to verbal stimuli  Pain score: 5  Pain management: adequate  Multimodal analgesia pain management approach  Airway patency: patent  Cardiovascular status: acceptable, blood pressure returned to baseline and hemodynamically stable  Respiratory status: acceptable, face mask, spontaneous ventilation, unassisted and nonlabored ventilation  Hydration status: acceptable  Postoperative Nausea and Vomiting: none  Comments: Handoff to Lala PACU RN        There were no known notable events for this encounter.

## 2025-02-07 NOTE — PROGRESS NOTES
02/07/25 1019   Discharge Planning   Living Arrangements Spouse/significant other   Support Systems Spouse/significant other   Type of Residence Private residence   Home or Post Acute Services None   Expected Discharge Disposition Home     Met with patient at bedside. Admitted for R nephrectomy. Pt lives with spouse and was independent PTA with no HHC or DME. PCP is Abdias Hamm. Pt feels he is able to manage his health and understands his medications. Was able to drive and obtain meds. Pt plans to return home with no new discharge needs. Family will provide transport.

## 2025-02-07 NOTE — PROGRESS NOTES
Urology Progress Note     Subjective:   No acute issues overnight. Pain moderately well-controlled.  No nausea, vomiting, fevers, chills, chest pain, or shortness of breath.   The patient is ambulating.   No flatus  No bowel movements    Current Diet: Adult diet Regular, Consistent Carb; CCD 60 gm/meal       Patient Vitals for the past 24 hrs:   BP Temp Temp src Pulse Resp SpO2   02/07/25 1200 (!) 154/99 36.7 °C (98.1 °F) Temporal (!) 111 18 95 %   02/07/25 0800 (!) 140/94 37.1 °C (98.8 °F) Temporal (!) 114 18 92 %   02/07/25 0400 (!) 152/105 37.2 °C (99 °F) Temporal (!) 114 18 93 %   02/07/25 0000 125/88 36.7 °C (98.1 °F) Temporal (!) 114 18 94 %   02/06/25 2133 126/79 36.5 °C (97.7 °F) Temporal (!) 112 18 95 %   02/06/25 2100 117/69 -- -- (!) 116 16 94 %   02/06/25 2045 115/72 36 °C (96.8 °F) Temporal 105 20 94 %   02/06/25 2030 123/60 -- -- (!) 114 16 94 %   02/06/25 2015 139/85 -- -- 106 17 93 %   02/06/25 2010 135/75 -- -- 107 18 93 %   02/06/25 2002 135/75 36 °C (96.8 °F) Temporal 106 24 98 %       Intake/Output Summary (Last 24 hours) at 2/7/2025 1529  Last data filed at 2/7/2025 1340  Gross per 24 hour   Intake 4158.19 ml   Output 2810 ml   Net 1348.19 ml       Physical Exam:   NAD  AOx3  Peripheral pulses palpable  Regular rate  Respirations nonlabored, symmetric chest rise bilaterally  Soft, ND, appropriately tender to palpation  Incisions- clean/dry/intact; no drainage or erythema  Wade catheter in place draining clear yellow urine  MILANA drain serosang  No calf tenderness to palpation bilaterally    Lab results  Results from last 7 days   Lab Units 02/07/25  0510 02/06/25  2128   SODIUM mmol/L 135* 134*   POTASSIUM mmol/L 3.9 4.2   CHLORIDE mmol/L 102 102   CO2 mmol/L 25 21   BUN mg/dL 15 14   CREATININE mg/dL 1.28 1.22   GLUCOSE mg/dL 148* 206*   CALCIUM mg/dL 8.0* 7.8*     Results from last 7 days   Lab Units 02/07/25  0510 02/06/25  2128   WBC AUTO x10*3/uL 10.2 16.5*   HEMOGLOBIN g/dL 14.0 14.6    HEMATOCRIT % 42.1 42.7   PLATELETS AUTO x10*3/uL 181 206       Lab Results   Component Value Date    APPEARANCEU Clear 01/23/2025    COLORU Light-Yellow 01/23/2025    KETONESU NEGATIVE 01/23/2025    PROTUR NEGATIVE 01/23/2025    SPECGRAVU 1.009 01/23/2025    UROBILINOGEN Normal 01/23/2025       Interval Imaging Findings:    Impression:    49-year-old male with 4 cm right renal mass concerning for neoplasm, tachycardia      Plan:   Continue regular diet  Hgb stable  Encourage ambulation.  MILANA fluid consistent with serum. Remove drain.   Incentive spirometry.  Pain control with oral medications.  Discontinued Wade catheter this morning and voiding.  Patient persistently tachycardic at approx 110-115 bpm. Has baseline tachycardia with HR 98 in pre-op testing. Increase may be due to pain. Hgb stable. Low risk of bleeding. No fever.   Request medicine evaluation for possible medical management, possible initiation of beta-blockers vs outpatient evaluation.     Yimi Arana MD  2/7/2025  3:29 PM  Pager#: [phone]

## 2025-02-07 NOTE — NURSING NOTE
The patient's wife was present for discharge teaching. No questions noted after teaching. Ivs were removed. MILANA Drain was removed per orders. The patient was given extra dressings and an IS. The patient requested nausea medication prior to discharge. Discharge medications were sent to his preferred pharmacy. Vitals stable accept for rapid heart rate but was cleared by internal medicine for discharge. The patient was educated to follow up with his primary care doctor about his on going tachycardia.

## 2025-02-08 LAB
ATRIAL RATE: 109 BPM
BLOOD EXPIRATION DATE: NORMAL
BLOOD EXPIRATION DATE: NORMAL
DISPENSE STATUS: NORMAL
DISPENSE STATUS: NORMAL
P AXIS: 53 DEGREES
P OFFSET: 198 MS
P ONSET: 131 MS
PR INTERVAL: 166 MS
PRODUCT BLOOD TYPE: 1700
PRODUCT BLOOD TYPE: 1700
PRODUCT CODE: NORMAL
PRODUCT CODE: NORMAL
Q ONSET: 214 MS
QRS COUNT: 18 BEATS
QRS DURATION: 102 MS
QT INTERVAL: 342 MS
QTC CALCULATION(BAZETT): 460 MS
QTC FREDERICIA: 417 MS
R AXIS: 48 DEGREES
T AXIS: -13 DEGREES
T OFFSET: 385 MS
UNIT ABO: NORMAL
UNIT ABO: NORMAL
UNIT NUMBER: NORMAL
UNIT NUMBER: NORMAL
UNIT RH: NORMAL
UNIT RH: NORMAL
UNIT VOLUME: 350
UNIT VOLUME: 350
VENTRICULAR RATE: 109 BPM
XM INTEP: NORMAL
XM INTEP: NORMAL

## 2025-02-08 NOTE — DISCHARGE SUMMARY
Discharge Diagnosis  Right renal mass    Issues Requiring Follow-Up  Surgical pathology    Test Results Pending At Discharge  Pending Labs       Order Current Status    Surgical Pathology Exam In process            Hospital Course   Patient underwent robotic right partial nephrectomy with extensive lysis of adhesions 2/6/2025.  He was admitted for postoperative care.  His postoperative course was uncomplicated.  The patient ambulated extensively and tolerated regular diet on postoperative day 1.  His pain was moderately well-controlled with oral pain medications.  He was found to be tachycardic with heart rate consistently approximately 110-115 which is increased from his baseline tachycardia of 100 bpm.  No signs of bleeding or infection were present.  Medicine was consulted and he was cleared for discharge without change in medications.  They did recommend follow-up with his primary care physician for continued management.  With the patient doing well on postoperative day 1, he was discharged home in good condition.  His MILANA drain was removed prior to discharge.        Home Medications     Medication List      START taking these medications     acetaminophen 325 mg tablet; Commonly known as: Tylenol; 2 tablets   orally every 8 hours for 3 days scheduled, then every 8 hours as needed   for pain. .   ibuprofen 600 mg tablet; 1 tablet orally every 8 hours for 3 days   scheduled, then every 8 hours as needed for pain.   oxyCODONE 5 mg immediate release tablet; Commonly known as: Roxicodone;   Take 1 tablet (5 mg) by mouth every 6 hours if needed for severe pain (7 -   10) for up to 5 days.     CONTINUE taking these medications     cholecalciferol 50 MCG (2000 UT) tablet; Commonly known as: Vitamin D-3   Dupixent Syringe 300 mg/2 mL prefilled syringe; Generic drug: dupilumab   Lantus U-100 Insulin 100 unit/mL injection; Generic drug: insulin   glargine   sildenafil 50 mg tablet; Commonly known as: Viagra            Yimi Arana MD

## 2025-02-19 LAB
LAB AP BLOCK FOR ADDITIONAL STUDIES: NORMAL
LABORATORY COMMENT REPORT: NORMAL
PATH REPORT.FINAL DX SPEC: NORMAL
PATH REPORT.GROSS SPEC: NORMAL
PATH REPORT.RELEVANT HX SPEC: NORMAL
PATH REPORT.TOTAL CANCER: NORMAL
PATHOLOGY SYNOPTIC REPORT: NORMAL

## 2025-03-25 LAB
ATRIAL RATE: 96 BPM
P AXIS: 51 DEGREES
P OFFSET: 200 MS
P ONSET: 143 MS
PR INTERVAL: 156 MS
Q ONSET: 221 MS
QRS COUNT: 16 BEATS
QRS DURATION: 82 MS
QT INTERVAL: 356 MS
QTC CALCULATION(BAZETT): 449 MS
QTC FREDERICIA: 416 MS
R AXIS: 33 DEGREES
T AXIS: 38 DEGREES
T OFFSET: 399 MS
VENTRICULAR RATE: 96 BPM

## (undated) DEVICE — EVACUATOR, WOUND, SUCTION, CLOSED, JACKSON-PRATT, 100 CC, SILICONE

## (undated) DEVICE — Device

## (undated) DEVICE — CARE KIT, LAPAROSCOPIC, ADVANCED

## (undated) DEVICE — SUTURE, PDS II, 1, 27 IN, CT-1, VIOLET

## (undated) DEVICE — GLOVE, SURGICAL, PROTEXIS PI MICRO, 7.0, PF, LF

## (undated) DEVICE — TISSEEL FIBRIN SEALANT, PRIMA, FROZEN, 10ML

## (undated) DEVICE — SUTURE, VICRYL, 1, 27 IN, CT-1, UNDYED

## (undated) DEVICE — ACCESS PORT, 12MM, 100MM LENGTH, LOW PROFILE W/BLADELESS OPTICAL TIP

## (undated) DEVICE — DRIVER, NEEDLE LARGE, DAVINCI XI

## (undated) DEVICE — DRAPE, SHEET, ENDOSCOPY, GENERAL, FENESTRATED, ARMBOARD COVER, 98 X 123.5 IN, DISPOSABLE, LF, STERILE

## (undated) DEVICE — GLOVE, SURGICAL, PROTEXIS PI MICRO, 7.5, PF, LF

## (undated) DEVICE — POSITIONING, THE PINK PAD, PIGAZZI SYSTEM

## (undated) DEVICE — SUTURE, SILK, 4-0, 18 IN, FS2, BLACK

## (undated) DEVICE — DRAIN, JP CHANNEL, 15 FR, RND, W/TROCAR

## (undated) DEVICE — SEAL, UNIVERSAL, 5-12MM

## (undated) DEVICE — COVER, TIP HOT SHEARS ENDOWRIST

## (undated) DEVICE — SUTURE, STRATAFIX, SPIRAL PDS PLUS, 2-0, 12IN, 30CM, CT-1, VIOLET

## (undated) DEVICE — ASSEMBLY, STRYKER FLOW 2, SUCTION IRRIGATOR, WITH TIP

## (undated) DEVICE — OBTURATOR, BLADELESS , SU

## (undated) DEVICE — CLIP, LIGATING, HEM-O-LOCK, MLX, LARGE, LF, PURPLE

## (undated) DEVICE — TUBING SET, TRI-LUMEN, FILTERED, F/AIRSEAL

## (undated) DEVICE — FORCEPS, BIPOLAR FENESTRATED XI

## (undated) DEVICE — SUTURE, VICRYL, 2-0, 27 IN, UR-6, VIOLET

## (undated) DEVICE — TRAY, SURESTEP, SILICONE DRAINAGE BAG, STATLOCK, 16FR

## (undated) DEVICE — SOLUTION, IRRIGATION, SODIUM CHLORIDE 0.9%, 1000 ML, POUR BOTTLE

## (undated) DEVICE — SUTURE, MONOCRYL, 4-0, 27 IN, PS-2, UNDYED

## (undated) DEVICE — RETRIEVAL SYSTEM, MONARCH, 10MM DISP ENDOSCOPIC

## (undated) DEVICE — KIT, APPLICATOR, DUPLOSPRAY, 30CM

## (undated) DEVICE — FORCEPS, PROGRASP, DAVINCI XI

## (undated) DEVICE — LUBRICANT, ELECTROLUBE, F/ELECTRODE TIPS

## (undated) DEVICE — DRESSING, GAUZE, DRAIN SPONGE, 6 PLY, EXCILON, 4 X 4 IN, STERILE

## (undated) DEVICE — DRAPE, ARM XI

## (undated) DEVICE — DRAPE, COLUMN, DAVINCI XI

## (undated) DEVICE — SOLUTION, IRRIGATION, STERILE WATER, 1000 ML, POUR BOTTLE

## (undated) DEVICE — TOWEL PACK, STERILE, 4/PACK, BLUE

## (undated) DEVICE — APPLICATOR, CHLORAPREP, W/ORANGE TINT, 26ML

## (undated) DEVICE — SPONGE, HEMOSTATIC, CELLULOSE, SURGICEL, 2 X 14 IN

## (undated) DEVICE — SCISSORS, METZ, CURVED, 3/4 BLADE

## (undated) DEVICE — SUTURE, V-LOC 90, 3-0, CV-20, VIOLET

## (undated) DEVICE — SCISSORS, MONOPOLAR, CURVED, 8MM

## (undated) DEVICE — NEEDLE, INSUFFLATION, 13GAX120MM, DISP

## (undated) DEVICE — TROCAR, KII OPTICAL BLADELESS 5MM Z THREAD 100MM LNGTH

## (undated) DEVICE — ADHESIVE, SKIN, DERMABOND ADVANCED, 15CM, PEN-STYLE

## (undated) DEVICE — CLIP, ABSORBABLE, VICRYL, LAPRA-TY, VIOLET